# Patient Record
Sex: FEMALE | Race: WHITE | NOT HISPANIC OR LATINO | Employment: FULL TIME | URBAN - METROPOLITAN AREA
[De-identification: names, ages, dates, MRNs, and addresses within clinical notes are randomized per-mention and may not be internally consistent; named-entity substitution may affect disease eponyms.]

---

## 2017-02-20 ENCOUNTER — GENERIC CONVERSION - ENCOUNTER (OUTPATIENT)
Dept: OTHER | Facility: OTHER | Age: 62
End: 2017-02-20

## 2017-02-20 DIAGNOSIS — Z79.01 LONG TERM CURRENT USE OF ANTICOAGULANT: ICD-10-CM

## 2017-04-07 DIAGNOSIS — Z79.01 LONG TERM CURRENT USE OF ANTICOAGULANT: ICD-10-CM

## 2017-09-29 DIAGNOSIS — Z79.01 LONG TERM CURRENT USE OF ANTICOAGULANT: ICD-10-CM

## 2017-12-28 LAB — INR PPP: 2.5 (ref 0.86–1.16)

## 2018-01-10 NOTE — MISCELLANEOUS
Message  phong having TKR on 3/17, will stop coumadin 5 days, start lovenox 4 days and hold am of surgery and continue after until therapeutic  paitient will be in hospital for a couple of days, she will let us know  Active Problems    1  Long-term (current) use of anticoagulants (V58 61) (Z79 01)    Current Meds   1  ClonazePAM 0 25 MG Oral Tablet Dispersible; Therapy: (Recorded:26Jan2015) to Recorded   2  Enoxaparin Sodium 150 MG/ML Subcutaneous Solution; 150mg sq od; Therapy: 86BSH4634 to (Last Rx:71Spo1299)  Requested for: 43BTJ1142 Ordered   3  Hydrocodone-Acetaminophen 5-325 MG Oral Tablet; Therapy: 46LHI0005 to Recorded   4  Losartan Potassium-HCTZ 100-12 5 MG Oral Tablet; Therapy: 27HSX2255 to Recorded   5  Lyrica 75 MG Oral Capsule; Therapy: 85Lxf6904 to Recorded   6  Warfarin Sodium 5 MG Oral Tablet; TAKE ONE TABLET BY MOUTH ONCE DAILY AS   DIRECTED; Therapy: 96DTZ3730 to (Evaluate:24Jun2015); Last Rx:31Mar2014 Ordered    Allergies    1  No Known Drug Allergies    Plan  Long-term (current) use of anticoagulants    · Enoxaparin Sodium 150 MG/ML Subcutaneous Solution; 150mg sq od    Signatures   Electronically signed by :  Luis F Flynn, ; Feb 20 2017  5:42PM EST                       (Author)

## 2018-01-12 LAB — INR PPP: 2.4 (ref 0.86–1.16)

## 2018-01-12 NOTE — MISCELLANEOUS
Message  per Dr Velásquez Pap patient to have 1 5mg/kg weight   ordered 150 mgm lovenox once a day, patient instructed to stop coumadin 5 days before surgery, begin lovenox 4 days before, hold am of procedure, and then post op will be on lovenox and coumadin until therapeutic on coumadin again  Active Problems    1  Long-term (current) use of anticoagulants (V58 61) (Z79 01)    Current Meds   1  ClonazePAM 0 25 MG Oral Tablet Dispersible; Therapy: (Recorded:26Jan2015) to Recorded   2  Enoxaparin Sodium 150 MG/ML Subcutaneous Solution; 150mg sq od; Therapy: 94ZQX2863 to (Last HF:10GQR6996)  Requested for: 96QRP8221 Ordered   3  Hydrocodone-Acetaminophen 5-325 MG Oral Tablet; Therapy: 08YAW6358 to Recorded   4  Losartan Potassium-HCTZ 100-12 5 MG Oral Tablet; Therapy: 48TVY6277 to Recorded   5  Lyrica 75 MG Oral Capsule; Therapy: 22Egs6869 to Recorded   6  Warfarin Sodium 5 MG Oral Tablet; TAKE ONE TABLET BY MOUTH ONCE DAILY AS   DIRECTED; Therapy: 21DTI2010 to (Evaluate:24Jun2015); Last Rx:31Mar2014 Ordered    Allergies    1  No Known Drug Allergies    Plan  Long-term (current) use of anticoagulants    · Enoxaparin Sodium 150 MG/ML Subcutaneous Solution; 150mg sq od    Signatures   Electronically signed by :  Adeline Kay, ; Feb  3 2016 10:19AM EST                       (Author)

## 2018-01-12 NOTE — MISCELLANEOUS
Message  patient called, she is having a total hip replacement on 3/11/2016 and her surgeon , Dr Bridget Egan asked for our direction for lovenox bridge  please fax directions to [phone] and notifiy patient  I will task dR Sammy Alicia for directions  Active Problems    1  Long-term (current) use of anticoagulants (V58 61) (Z79 01)    Current Meds   1  ClonazePAM 0 25 MG Oral Tablet Dispersible; Therapy: (Recorded:26Jan2015) to Recorded   2  Enoxaparin Sodium 150 MG/ML Subcutaneous Solution; 150mg sq od; Therapy: 50QNC1669 to (Last KW:99JNQ6131)  Requested for: 48YXP6807 Ordered   3  Hydrocodone-Acetaminophen 5-325 MG Oral Tablet; Therapy: 97XHF8270 to Recorded   4  Losartan Potassium-HCTZ 100-12 5 MG Oral Tablet; Therapy: 47NTJ0377 to Recorded   5  Lyrica 75 MG Oral Capsule; Therapy: 27Wkn5027 to Recorded   6  Warfarin Sodium 5 MG Oral Tablet; TAKE ONE TABLET BY MOUTH ONCE DAILY AS   DIRECTED; Therapy: 54GYS9166 to (Evaluate:24Jun2015); Last Rx:31Mar2014 Ordered    Allergies    1  No Known Drug Allergies    Signatures   Electronically signed by :  Lilian Quan, ; Jan 22 2016 11:26AM EST                       (Author)

## 2018-01-26 ENCOUNTER — ANTICOAG VISIT (OUTPATIENT)
Dept: VASCULAR SURGERY | Facility: CLINIC | Age: 63
End: 2018-01-26

## 2018-01-26 LAB — INR PPP: 2.4 (ref 0.86–1.16)

## 2018-02-09 ENCOUNTER — ANTICOAG VISIT (OUTPATIENT)
Dept: VASCULAR SURGERY | Facility: CLINIC | Age: 63
End: 2018-02-09

## 2018-02-09 LAB — INR PPP: 2.4 (ref 0.86–1.16)

## 2018-02-12 ENCOUNTER — ANTICOAG VISIT (OUTPATIENT)
Dept: VASCULAR SURGERY | Facility: CLINIC | Age: 63
End: 2018-02-12

## 2018-02-12 LAB — INR PPP: 2.4 (ref 0.86–1.16)

## 2018-02-23 ENCOUNTER — ANTICOAG VISIT (OUTPATIENT)
Dept: VASCULAR SURGERY | Facility: CLINIC | Age: 63
End: 2018-02-23

## 2018-02-23 LAB — INR PPP: 2.3 (ref 0.86–1.16)

## 2018-04-09 ENCOUNTER — ANTICOAG VISIT (OUTPATIENT)
Dept: VASCULAR SURGERY | Facility: CLINIC | Age: 63
End: 2018-04-09

## 2018-04-09 LAB — INR PPP: 2.6 (ref 0.86–1.16)

## 2018-04-20 ENCOUNTER — ANTICOAG VISIT (OUTPATIENT)
Dept: VASCULAR SURGERY | Facility: CLINIC | Age: 63
End: 2018-04-20

## 2018-04-20 LAB — INR PPP: 2.4 (ref 0.86–1.16)

## 2018-05-21 ENCOUNTER — ANTICOAG VISIT (OUTPATIENT)
Dept: VASCULAR SURGERY | Facility: CLINIC | Age: 63
End: 2018-05-21

## 2018-05-21 LAB — INR PPP: 2.2 (ref 0.86–1.17)

## 2018-06-01 ENCOUNTER — ANTICOAG VISIT (OUTPATIENT)
Dept: VASCULAR SURGERY | Facility: CLINIC | Age: 63
End: 2018-06-01

## 2018-06-01 LAB — INR PPP: 2.8 (ref 0.86–1.17)

## 2018-06-15 ENCOUNTER — ANTICOAG VISIT (OUTPATIENT)
Dept: VASCULAR SURGERY | Facility: CLINIC | Age: 63
End: 2018-06-15

## 2018-06-15 LAB — INR PPP: 2.9 (ref 0.86–1.17)

## 2018-06-29 ENCOUNTER — ANTICOAG VISIT (OUTPATIENT)
Dept: VASCULAR SURGERY | Facility: CLINIC | Age: 63
End: 2018-06-29

## 2018-06-29 LAB — INR PPP: 2.6 (ref 0.86–1.17)

## 2018-07-13 ENCOUNTER — ANTICOAG VISIT (OUTPATIENT)
Dept: VASCULAR SURGERY | Facility: CLINIC | Age: 63
End: 2018-07-13

## 2018-07-13 LAB — INR PPP: 2.9 (ref 0.86–1.17)

## 2018-07-27 ENCOUNTER — ANTICOAG VISIT (OUTPATIENT)
Dept: VASCULAR SURGERY | Facility: CLINIC | Age: 63
End: 2018-07-27

## 2018-07-27 LAB — INR PPP: 2.8 (ref 0.86–1.17)

## 2018-08-13 ENCOUNTER — ANTICOAG VISIT (OUTPATIENT)
Dept: VASCULAR SURGERY | Facility: CLINIC | Age: 63
End: 2018-08-13

## 2018-08-13 LAB — INR PPP: 2.8 (ref 0.86–1.17)

## 2018-08-24 ENCOUNTER — ANTICOAG VISIT (OUTPATIENT)
Dept: VASCULAR SURGERY | Facility: CLINIC | Age: 63
End: 2018-08-24

## 2018-08-24 LAB — INR PPP: 2.6 (ref 0.86–1.17)

## 2018-09-07 ENCOUNTER — ANTICOAG VISIT (OUTPATIENT)
Dept: VASCULAR SURGERY | Facility: CLINIC | Age: 63
End: 2018-09-07

## 2018-09-07 LAB — INR PPP: 2.8 (ref 0.86–1.17)

## 2018-09-18 ENCOUNTER — TELEPHONE (OUTPATIENT)
Dept: VASCULAR SURGERY | Facility: CLINIC | Age: 63
End: 2018-09-18

## 2018-09-18 NOTE — TELEPHONE ENCOUNTER
Received letter that pt has been approved for [facility] home monitoring system for PT/INR testing  I called pt and LMOM informing her of this and requested she call us back to confirm that she has also received the letter and if not we can mail her a copy

## 2018-09-21 ENCOUNTER — ANTICOAG VISIT (OUTPATIENT)
Dept: VASCULAR SURGERY | Facility: CLINIC | Age: 63
End: 2018-09-21

## 2018-09-21 LAB — INR PPP: 2.7 (ref 0.86–1.17)

## 2018-09-22 ENCOUNTER — HOSPITAL ENCOUNTER (EMERGENCY)
Facility: HOSPITAL | Age: 63
Discharge: HOME | End: 2018-09-22
Attending: EMERGENCY MEDICINE | Admitting: EMERGENCY MEDICINE
Payer: COMMERCIAL

## 2018-09-22 ENCOUNTER — APPOINTMENT (EMERGENCY)
Dept: CT IMAGING | Facility: HOSPITAL | Age: 63
End: 2018-09-22
Payer: COMMERCIAL

## 2018-09-22 VITALS
HEART RATE: 68 BPM | BODY MASS INDEX: 33.13 KG/M2 | TEMPERATURE: 98 F | OXYGEN SATURATION: 97 % | RESPIRATION RATE: 18 BRPM | DIASTOLIC BLOOD PRESSURE: 56 MMHG | WEIGHT: 193 LBS | SYSTOLIC BLOOD PRESSURE: 94 MMHG

## 2018-09-22 DIAGNOSIS — F10.929 ALCOHOL INTOXICATION (HCC): ICD-10-CM

## 2018-09-22 DIAGNOSIS — S00.83XA FOREHEAD CONTUSION: ICD-10-CM

## 2018-09-22 DIAGNOSIS — S06.0X9A CONCUSSION: Primary | ICD-10-CM

## 2018-09-22 LAB
ANION GAP SERPL CALCULATED.3IONS-SCNC: 13 MMOL/L (ref 4–13)
APTT PPP: 44 SECONDS (ref 24–36)
BASOPHILS # BLD AUTO: 0.03 THOUSANDS/ΜL (ref 0–0.1)
BASOPHILS NFR BLD AUTO: 0 % (ref 0–1)
BUN SERPL-MCNC: 47 MG/DL (ref 5–25)
CALCIUM SERPL-MCNC: 9.2 MG/DL (ref 8.3–10.1)
CHLORIDE SERPL-SCNC: 101 MMOL/L (ref 100–108)
CO2 SERPL-SCNC: 25 MMOL/L (ref 21–32)
CREAT SERPL-MCNC: 1.23 MG/DL (ref 0.6–1.3)
EOSINOPHIL # BLD AUTO: 0.14 THOUSAND/ΜL (ref 0–0.61)
EOSINOPHIL NFR BLD AUTO: 2 % (ref 0–6)
ERYTHROCYTE [DISTWIDTH] IN BLOOD BY AUTOMATED COUNT: 12.9 % (ref 11.6–15.1)
ETHANOL SERPL-MCNC: 289 MG/DL (ref 0–3)
GFR SERPL CREATININE-BSD FRML MDRD: 46 ML/MIN/1.73SQ M
GLUCOSE SERPL-MCNC: 106 MG/DL (ref 65–140)
HCT VFR BLD AUTO: 37.6 % (ref 34.8–46.1)
HGB BLD-MCNC: 12.4 G/DL (ref 11.5–15.4)
IMM GRANULOCYTES # BLD AUTO: 0.03 THOUSAND/UL (ref 0–0.2)
IMM GRANULOCYTES NFR BLD AUTO: 0 % (ref 0–2)
INR PPP: 2.89 (ref 0.86–1.17)
LYMPHOCYTES # BLD AUTO: 3.27 THOUSANDS/ΜL (ref 0.6–4.47)
LYMPHOCYTES NFR BLD AUTO: 36 % (ref 14–44)
MCH RBC QN AUTO: 34.6 PG (ref 26.8–34.3)
MCHC RBC AUTO-ENTMCNC: 33 G/DL (ref 31.4–37.4)
MCV RBC AUTO: 105 FL (ref 82–98)
MONOCYTES # BLD AUTO: 1.57 THOUSAND/ΜL (ref 0.17–1.22)
MONOCYTES NFR BLD AUTO: 17 % (ref 4–12)
NEUTROPHILS # BLD AUTO: 4.15 THOUSANDS/ΜL (ref 1.85–7.62)
NEUTS SEG NFR BLD AUTO: 45 % (ref 43–75)
NRBC BLD AUTO-RTO: 0 /100 WBCS
PLATELET # BLD AUTO: 224 THOUSANDS/UL (ref 149–390)
PMV BLD AUTO: 11.2 FL (ref 8.9–12.7)
POTASSIUM SERPL-SCNC: 3.8 MMOL/L (ref 3.5–5.3)
PROTHROMBIN TIME: 29.4 SECONDS (ref 11.8–14.2)
RBC # BLD AUTO: 3.58 MILLION/UL (ref 3.81–5.12)
SODIUM SERPL-SCNC: 139 MMOL/L (ref 136–145)
WBC # BLD AUTO: 9.19 THOUSAND/UL (ref 4.31–10.16)

## 2018-09-22 PROCEDURE — 96360 HYDRATION IV INFUSION INIT: CPT

## 2018-09-22 PROCEDURE — 72125 CT NECK SPINE W/O DYE: CPT

## 2018-09-22 PROCEDURE — 80048 BASIC METABOLIC PNL TOTAL CA: CPT | Performed by: EMERGENCY MEDICINE

## 2018-09-22 PROCEDURE — 85730 THROMBOPLASTIN TIME PARTIAL: CPT | Performed by: EMERGENCY MEDICINE

## 2018-09-22 PROCEDURE — 36415 COLL VENOUS BLD VENIPUNCTURE: CPT | Performed by: EMERGENCY MEDICINE

## 2018-09-22 PROCEDURE — 70450 CT HEAD/BRAIN W/O DYE: CPT

## 2018-09-22 PROCEDURE — 85610 PROTHROMBIN TIME: CPT | Performed by: EMERGENCY MEDICINE

## 2018-09-22 PROCEDURE — 80320 DRUG SCREEN QUANTALCOHOLS: CPT | Performed by: EMERGENCY MEDICINE

## 2018-09-22 PROCEDURE — 85025 COMPLETE CBC W/AUTO DIFF WBC: CPT | Performed by: EMERGENCY MEDICINE

## 2018-09-22 PROCEDURE — 99284 EMERGENCY DEPT VISIT MOD MDM: CPT

## 2018-09-22 RX ADMIN — SODIUM CHLORIDE 1000 ML: 0.9 INJECTION, SOLUTION INTRAVENOUS at 20:18

## 2018-09-23 NOTE — ED PROVIDER NOTES
History  Chief Complaint   Patient presents with    Fall     Pt  c/o injury to left forehead after unwitnessed fall at home to the floor approx  45 minutes ago  Pt  admits to using alcohol, approximately 2-3 glasses of wine today  Unknown loc  Pt  (+) Coumadin  79-year-old female presents to the emergency department after falling at home  Patient's niece and best friend are at the bedside to help provide history  He states the patient has been drinking wine today  She does drink approximately 2 to 3 glasses of wine per day  This evening she lost her balance and struck the right side of her head on a piece of furniture  Family does suspect that she had a loss of consciousness  Patient was disoriented to year when her family assessed her  She is currently oriented to person place and time though she does seems groggy  No reported vomiting  No blood loss at the scene  History provided by:  Patient and medical records   used: No    Fall   Mechanism of injury: fall    Injury location:  Head/neck  Head/neck injury location:  Head  Incident location:  Home  Arrived directly from scene: yes    Fall:     Fall occurred:  Tripped    Impact surface:  Furniture    Point of impact:  Head    Entrapped after fall: no    Suspicion of alcohol use: yes    Suspicion of drug use: no    Prior to arrival data:     Patient ambulatory at scene: yes      Blood loss:  None    Responsiveness at scene: Groggy  Orientation at scene:  Person and place (Disoriented to time)    Loss of consciousness: Unknown  Amnesic to event: no    Associated symptoms: headaches    Associated symptoms: no back pain, no blindness, no nausea, no seizures and no vomiting    Risk factors: anticoagulation therapy        Prior to Admission Medications   Prescriptions Last Dose Informant Patient Reported? Taking? Calcium Carb-Cholecalciferol (CALCIUM 600 + D PO)   Yes Yes   Sig: Take 1 tablet by mouth daily  clonazePAM (KlonoPIN) 0 25 MG disintegrating tablet   Yes Yes   Sig: Take 0 25 mg by mouth 2 (two) times a day  losartan-hydrochlorothiazide (HYZAAR) 100-25 MG per tablet   Yes Yes   Sig: Take 1 tablet by mouth daily  warfarin (COUMADIN) 5 mg tablet   Yes Yes   Sig: Take 5 mg by mouth daily  Last dose 3/5      Facility-Administered Medications: None       Past Medical History:   Diagnosis Date    History of DVT of lower extremity     herson    History of pulmonary embolism     Hypertension     Lower back pain     OA (osteoarthritis)     PONV (postoperative nausea and vomiting)     with sodium pentathol    Spinal stenosis     Use of cane as ambulatory aid     Wears glasses        Past Surgical History:   Procedure Laterality Date    COLONOSCOPY      OVARIAN CYST REMOVAL      VT TOTAL HIP ARTHROPLASTY Right 3/11/2016    Procedure: ARTHROPLASTY HIP TOTAL;  Surgeon: Sinai Velasquez MD;  Location: [facility] OR;  Service: Orthopedics    TONSILLECTOMY         History reviewed  No pertinent family history  I have reviewed and agree with the history as documented  Social History   Substance Use Topics    Smoking status: Never Smoker    Smokeless tobacco: Not on file    Alcohol use Yes      Comment: 1-2  / day        Review of Systems   Eyes: Negative for blindness  Gastrointestinal: Negative for nausea and vomiting  Musculoskeletal: Negative for back pain  Neurological: Positive for headaches  Negative for seizures  All other systems reviewed and are negative  Physical Exam  Physical Exam   Constitutional: She is oriented to person, place, and time  She appears well-developed and well-nourished  HENT:   Head: Normocephalic  Head is with contusion  Head is without raccoon's eyes, without Washburn's sign and without laceration  Right Ear: Tympanic membrane normal  No hemotympanum  Left Ear: Tympanic membrane normal  No hemotympanum     Nose: No nasal deformity, septal deviation or nasal septal hematoma  Mouth/Throat: Uvula is midline and oropharynx is clear and moist  No oropharyngeal exudate  Eyes: Conjunctivae and EOM are normal  Pupils are equal, round, and reactive to light  Neck: Normal range of motion  Neck supple  No spinous process tenderness and no muscular tenderness present  No neck rigidity  Normal range of motion present  Cardiovascular: Normal rate, regular rhythm, normal heart sounds and intact distal pulses  Pulmonary/Chest: Effort normal and breath sounds normal    Abdominal: Soft  Bowel sounds are normal  She exhibits no distension  There is no tenderness  There is no rebound and no guarding  Musculoskeletal: Normal range of motion  She exhibits no edema, tenderness or deformity  Left hand: She exhibits no tenderness, normal capillary refill, no deformity and no swelling  Hands:  Ecchymosis of the left 3rd and 4th fingers, appears subacute   Lymphadenopathy:     She has no cervical adenopathy  Neurological: She is alert and oriented to person, place, and time  She has normal strength and normal reflexes  No cranial nerve deficit or sensory deficit  She exhibits normal muscle tone  Coordination and gait normal    Speech is clear   Skin: Skin is warm, dry and intact  No rash noted  Psychiatric: She has a normal mood and affect  Her behavior is normal  Judgment and thought content normal    Nursing note and vitals reviewed        Vital Signs  ED Triage Vitals   Temperature Pulse Respirations Blood Pressure SpO2   09/22/18 1953 09/22/18 1953 09/22/18 1953 09/22/18 1953 09/22/18 1953   98 °F (36 7 °C) 68 20 136/78 99 %      Temp Source Heart Rate Source Patient Position - Orthostatic VS BP Location FiO2 (%)   09/22/18 1953 09/22/18 2106 -- 09/22/18 2106 --   Oral Monitor  Left arm       Pain Score       09/22/18 1953       4           Vitals:    09/22/18 2100 09/22/18 2106 09/22/18 2115 09/22/18 2130   BP: 98/63 98/63 99/57 94/56   Pulse: 66 65 66 68 Visual Acuity  Visual Acuity      Most Recent Value   L Pupil Size (mm)  6   R Pupil Size (mm)  6          ED Medications  Medications   sodium chloride 0 9 % bolus 1,000 mL (0 mL Intravenous Stopped 9/22/18 2135)       Diagnostic Studies  Results Reviewed     Procedure Component Value Units Date/Time    Ethanol [[id number]]  (Abnormal) Collected:  09/22/18 2017    Lab Status:  Final result Specimen:  Blood from Arm, Right Updated:  09/22/18 2036     Ethanol Lvl 289 (H) mg/dL     Protime-INR [[id number]]  (Abnormal) Collected:  09/22/18 2017    Lab Status:  Final result Specimen:  Blood from Arm, Right Updated:  09/22/18 2036     Protime 29 4 (H) seconds      INR 2 89 (H)    APTT [[id number]]  (Abnormal) Collected:  09/22/18 2017    Lab Status:  Final result Specimen:  Blood from Arm, Right Updated:  09/22/18 2036     PTT 44 (H) seconds     Basic metabolic panel [[id number]]  (Abnormal) Collected:  09/22/18 2017    Lab Status:  Final result Specimen:  Blood from Arm, Right Updated:  09/22/18 2034     Sodium 139 mmol/L      Potassium 3 8 mmol/L      Chloride 101 mmol/L      CO2 25 mmol/L      ANION GAP 13 mmol/L      BUN 47 (H) mg/dL      Creatinine 1 23 mg/dL      Glucose 106 mg/dL      Calcium 9 2 mg/dL      eGFR 46 ml/min/1 73sq m     Narrative:         National Kidney Disease Education Program recommendations are as follows:  GFR calculation is accurate only with a steady state creatinine  Chronic Kidney disease less than 60 ml/min/1 73 sq  meters  Kidney failure less than 15 ml/min/1 73 sq  meters      CBC and differential [[id number]]  (Abnormal) Collected:  09/22/18 2017    Lab Status:  Final result Specimen:  Blood from Arm, Right Updated:  09/22/18 2024     WBC 9 19 Thousand/uL      RBC 3 58 (L) Million/uL      Hemoglobin 12 4 g/dL      Hematocrit 37 6 %       (H) fL      MCH 34 6 (H) pg      MCHC 33 0 g/dL      RDW 12 9 %      MPV 11 2 fL      Platelets 684 Thousands/uL      nRBC 0 /100 WBCs Neutrophils Relative 45 %      Immat GRANS % 0 %      Lymphocytes Relative 36 %      Monocytes Relative 17 (H) %      Eosinophils Relative 2 %      Basophils Relative 0 %      Neutrophils Absolute 4 15 Thousands/µL      Immature Grans Absolute 0 03 Thousand/uL      Lymphocytes Absolute 3 27 Thousands/µL      Monocytes Absolute 1 57 (H) Thousand/µL      Eosinophils Absolute 0 14 Thousand/µL      Basophils Absolute 0 03 Thousands/µL                  CT head without contrast   Final Result by Rodolfo Harrison MD (09/22 2128)      Left frontal scalp hematoma without subjacent fracture, intracranial hemorrhage, or other acute intracranial findings  Workstation performed: [id number]         CT cervical spine without contrast   Final Result by Rodolfo Harrison MD (09/22 2127)      No cervical spine fracture or traumatic malalignment  Workstation performed: [id number]                    Procedures  Procedures       Phone Contacts  ED Phone Contact    ED Course  ED Course as of Sep 22 2326   Sat Sep 22, 2018   2151 Patient did ambulate but seemed to be slightly off balance, likely a combination recent head injury as well as high alcohol level  Will provide p o  fluid and snack and observe for now, will recheck to ensure that is stability is improved prior to discharging home    2228 Patient is up and ambulating in the department without difficulty now  She did have a snack and tolerated oral intake  Patient's family does feel comfortable taking her home and will stay with her overnight                                  MDM  Number of Diagnoses or Management Options  Alcohol intoxication ([facility] [address] ): new and requires workup  Concussion: new and requires workup  Forehead contusion: new and requires workup     Amount and/or Complexity of Data Reviewed  Clinical lab tests: ordered and reviewed  Tests in the radiology section of CPT®: ordered and reviewed  Decide to obtain previous medical records or to obtain history from someone other than the patient: yes  Obtain history from someone other than the patient: yes  Independent visualization of images, tracings, or specimens: yes    Risk of Complications, Morbidity, and/or Mortality  General comments: 26-year-old female status post fall from standing while intoxicated, anticoagulated with Coumadin  Patient's CT scan demonstrates a soft tissue could hematoma no intracranial pathology noted  Patient appears clinically sober her speech is clear and she is able to ambulate without assistance  Family does feel comfortable taking her home  I discussed concussion and signs and symptoms to return to the emergency department with the patient and her niece who plans to stay with her tonight  Patient Progress  Patient progress: improved    CritCare Time    Disposition  Final diagnoses:   Concussion   Forehead contusion   Alcohol intoxication ([facility] [address] )     Time reflects when diagnosis was documented in both MDM as applicable and the Disposition within this note     Time User Action Codes Description Comment    9/22/2018 10:30 PM Sandi Neumann Add [S06 0X9A] Concussion     9/22/2018 10:30 PM Farhana Sprague Add [S00 83XA] Forehead contusion     9/22/2018 10:30 PM Farhana Sprague Add [F10 929] Alcohol intoxication [facility])       ED Disposition     ED Disposition Condition Comment    Discharge  [place] discharge to home/self care  Condition at discharge: Stable        Follow-up Information     Follow up With Specialties Details Why Contact Iliana Watson MD Internal Medicine Schedule an appointment as soon as possible for a visit in 2 days For recheck of current symptoms [address] [place] [address]             Discharge Medication List as of 9/22/2018 10:31 PM      CONTINUE these medications which have NOT CHANGED    Details   Calcium Carb-Cholecalciferol (CALCIUM 600 + D PO) Take 1 tablet by mouth daily  , Until Discontinued, Historical Med      clonazePAM (KlonoPIN) 0 25 MG disintegrating tablet Take 0 25 mg by mouth 2 (two) times a day , Until Discontinued, Historical Med      losartan-hydrochlorothiazide (HYZAAR) 100-25 MG per tablet Take 1 tablet by mouth daily  , Until Discontinued, Historical Med      warfarin (COUMADIN) 5 mg tablet Take 5 mg by mouth daily  Last dose 3/5, Until Discontinued, Historical Med           No discharge procedures on file      ED Provider  Electronically Signed by           Jaxon Rocha DO  09/22/18 3029

## 2018-09-23 NOTE — DISCHARGE INSTRUCTIONS
Concussion   WHAT YOU NEED TO KNOW:   A concussion is a mild brain injury  It is usually caused by a bump or blow to the head from a fall, a motor vehicle crash, or a sports injury  Sometimes being shaken forcefully may cause a concussion  DISCHARGE INSTRUCTIONS:   Have someone else call 911 for the following:   · Someone tries to wake you and cannot do so  · You have a seizure, increasing confusion, or a change in personality  · Your speech becomes slurred, or you have new vision problems  Return to the emergency department if:   · You have a severe headache that does not go away  · You have arm or leg weakness, numbness, or new problems with coordination  · You have blood or clear fluid coming out of the ears or nose  Contact your healthcare provider if:   · You have nausea or are vomiting  · You feel more sleepy than usual     · Your symptoms get worse  · Your symptoms last longer than 6 weeks after the injury  · You have questions or concerns about your condition or care  Medicines:   · Acetaminophen  helps to decrease pain  It is available without a doctor's order  Ask how much to take and how often to take it  Follow directions  Acetaminophen can cause liver damage if not taken correctly  · NSAIDs , such as ibuprofen, help decrease swelling and pain  NSAIDs can cause stomach bleeding or kidney problems in certain people  If you take blood thinner medicine, always ask your healthcare provider if NSAIDs are safe for you  Always read the medicine label and follow directions  · Take your medicine as directed  Contact your healthcare provider if you think your medicine is not helping or if you have side effects  Tell him or her if you are allergic to any medicine  Keep a list of the medicines, vitamins, and herbs you take  Include the amounts, and when and why you take them  Bring the list or the pill bottles to follow-up visits   Carry your medicine list with you in case of an emergency  Follow up with your healthcare provider as directed:  Write down your questions so you remember to ask them during your visits  Self-care:   · Rest  from physical and mental activities as directed  Mental activities are those that require thinking, concentration, and attention  You will need to rest until your symptoms are gone  Rest will allow you to recover from your concussion  Ask your healthcare provider when you can return to work and other daily activities  · Have someone stay with you for the first 24 hours after your injury  Your healthcare provider should be contacted if your symptoms get worse, or you develop new symptoms  · Do not participate in sports and physical activities until your healthcare provider says it is okay  They could make your symptoms worse or lead to another concussion  Your healthcare provider will tell you when it is okay for you to return to sports or physical activities  Prevent another concussion:   · Wear protective sports equipment that fit properly  Helmets help decrease your risk of a serious brain injury  Talk to your healthcare provider about ways you can decrease your risk for a concussion if you play sports  · Wear your seat belt  every time you travel  This helps to decrease your risk of a head injury if you are in a car accident  © 2017 [facility] Information is for End User's use only and may not be sold, redistributed or otherwise used for commercial purposes  All illustrations and images included in CareNotes® are the copyrighted property of A D A M , Inc  or [employer] Pickett  The above information is an  only  It is not intended as medical advice for individual conditions or treatments  Talk to your doctor, nurse or pharmacist before following any medical regimen to see if it is safe and effective for you      Alcohol Intoxication   WHAT YOU NEED TO KNOW:   Alcohol intoxication is a harmful physical condition caused when you drink more alcohol than your body can handle  It is also called ethanol poisoning, or being drunk  DISCHARGE INSTRUCTIONS:   Medicine: You may be given medicine to manage the signs and symptoms of alcohol intoxication  Take your medicine as directed  Contact your healthcare provider if you think your medicine is not helping or if you have side effects  Tell him if you are allergic to any medicine  Keep a list of the medicines, vitamins, and herbs you take  Include the amounts, and when and why you take them  Bring the list or the pill bottles to follow-up visits  Keep the list with you in case of emergency  Follow up with your healthcare provider as directed:  Write down your questions so you remember to ask them during your visits  Limit or avoid alcohol:  Men should not have more than 2 drinks per day  Women should not have more than 1 drink per day  A drink is 12 ounces of beer, 5 ounces of wine, or 1½ ounces of liquor  Do not drive or operate machines when you drink alcohol:  Make sure you always have someone to drive you when you drink alcohol  Learn ways to manage stress  Deep breathing, meditation, and listening to music may help you cope with stressful events  Talk to your caregiver about other ways to manage stress  For more information:   · Alcoholics Anonymous  Web Address: http://www campos info/  Contact your healthcare provider if:   · You need help to stop drinking alcohol  · You have trouble with work or school because you drink too much alcohol  · You have physical or verbal fights because of alcohol  · You have questions or concerns about your condition or care  Seek care immediately or call 911 if:   · You have sudden trouble breathing or chest pain  · You have a seizure  · You feel sad enough to harm yourself or others  · You have hallucinations (you see or hear things that are not real)  · You cannot stop vomiting       · You were in an accident because of alcohol  © 2017 [address] [employer] Information is for End User's use only and may not be sold, redistributed or otherwise used for commercial purposes  All illustrations and images included in CareNotes® are the copyrighted property of A D A M , Inc  or [employer] Pickett  The above information is an  only  It is not intended as medical advice for individual conditions or treatments  Talk to your doctor, nurse or pharmacist before following any medical regimen to see if it is safe and effective for you  Contusion in Adults   AMBULATORY CARE:   A contusion  is a bruise that appears on your skin after an injury  A bruise happens when small blood vessels tear but skin does not  When blood vessels tear, blood leaks into nearby tissue, such as soft tissue or muscle  Other signs and symptoms you may have with a contusion:   · Pain that increases when you touch the bruise, walk, or use the area around the bruise    · Swelling or a lump at the site of the bruise or near it    · Red, blue, or black skin that may change to green or yellow after a few days           · Stiffness or problems moving the bruised area of your body  Seek care immediately if:   · You have new trouble moving the injured area  · You have tingling or numbness in or near the injured area  · Your hand or foot below the bruise gets cold or turns pale  Contact your healthcare provider if:   · You find a new lump in the injured area  · Your symptoms do not improve with treatment after 4 to 5 days  · You have questions or concerns about your condition or care  Treatment for a contusion  may include any of the following:  · NSAIDs , such as ibuprofen, help decrease swelling, pain, and fever  This medicine is available with or without a doctor's order  NSAIDs can cause stomach bleeding or kidney problems in certain people   If you take blood thinner medicine, always ask your healthcare provider if NSAIDs are safe for you  Always read the medicine label and follow directions  · Prescription pain medicine  may be given  Do not wait until the pain is severe before you take your medicine  · Aspiration  is a procedure used to drain pooled blood in your muscle  This may help prevent increased pressure in the muscle  · Surgery  may be done to repair a tear in the muscle or relieve pressure in the muscle caused by swelling  Help a contusion heal:   · Rest the injured area  or use it less than usual  If you bruised your leg or foot, you may need crutches or a cane to help you walk  This will help you keep weight off your injured body part  · Apply ice  to decrease swelling and pain  Ice may also help prevent tissue damage  Use an ice pack, or put crushed ice in a plastic bag  Cover it with a towel and place it on your bruise for 15 to 20 minutes every hour or as directed  · Use compression  to support the area and decrease swelling  Wrap an elastic bandage around the area over the bruised muscle  Make sure the bandage is not too tight  You should be able to fit 1 finger between the bandage and your skin  · Elevate (raise) your injured body part  above the level of your heart to help decrease pain and swelling  Use pillows, blankets, or rolled towels to elevate the area as often as you can  · Do not drink alcohol  as directed  Alcohol may slow healing  · Do not stretch injured muscles  right after your injury  Ask your healthcare provider when and how you may safely stretch after your injury  Gentle stretches can help increase your flexibility  · Do not massage the area or put heating pads  on the bruise right after your injury  Heat and massage may slow healing  Your healthcare provider may tell you to apply heat after several days   At that time, heat will start to help the injury heal   Follow up with your healthcare provider as directed:  Write down your questions so you remember to ask them during your visits  © 2017 [address] [employer] Information is for End User's use only and may not be sold, redistributed or otherwise used for commercial purposes  All illustrations and images included in CareNotes® are the copyrighted property of A [employer] M [employer] Inc  or Luis Antonio Pickett  The above information is an  only  It is not intended as medical advice for individual conditions or treatments  Talk to your doctor, nurse or pharmacist before following any medical regimen to see if it is safe and effective for you

## 2018-10-03 DIAGNOSIS — Z92.29 HX OF LONG TERM USE OF BLOOD THINNERS: Primary | ICD-10-CM

## 2018-10-04 RX ORDER — WARFARIN SODIUM 5 MG/1
TABLET ORAL
Qty: 90 TABLET | Refills: 3 | Status: SHIPPED | OUTPATIENT
Start: 2018-10-04 | End: 2019-10-14 | Stop reason: SDUPTHER

## 2018-10-05 ENCOUNTER — ANTICOAG VISIT (OUTPATIENT)
Dept: VASCULAR SURGERY | Facility: CLINIC | Age: 63
End: 2018-10-05

## 2018-10-05 LAB — INR PPP: 2.4 (ref 0.86–1.17)

## 2018-10-22 ENCOUNTER — ANTICOAG VISIT (OUTPATIENT)
Dept: VASCULAR SURGERY | Facility: CLINIC | Age: 63
End: 2018-10-22

## 2018-10-22 LAB — INR PPP: 2.5 (ref 0.86–1.17)

## 2018-11-02 ENCOUNTER — ANTICOAG VISIT (OUTPATIENT)
Dept: VASCULAR SURGERY | Facility: CLINIC | Age: 63
End: 2018-11-02

## 2018-11-02 LAB — INR PPP: 2.8 (ref 0.86–1.17)

## 2018-11-16 ENCOUNTER — ANTICOAG VISIT (OUTPATIENT)
Dept: VASCULAR SURGERY | Facility: CLINIC | Age: 63
End: 2018-11-16

## 2018-11-16 LAB — INR PPP: 2.9 (ref 0.86–1.17)

## 2018-12-03 ENCOUNTER — ANTICOAG VISIT (OUTPATIENT)
Dept: VASCULAR SURGERY | Facility: CLINIC | Age: 63
End: 2018-12-03

## 2018-12-03 LAB — INR PPP: 2.5 (ref 0.86–1.17)

## 2018-12-17 ENCOUNTER — ANTICOAG VISIT (OUTPATIENT)
Dept: VASCULAR SURGERY | Facility: CLINIC | Age: 63
End: 2018-12-17

## 2018-12-17 LAB — INR PPP: 2.9 (ref 0.86–1.17)

## 2018-12-28 ENCOUNTER — ANTICOAG VISIT (OUTPATIENT)
Dept: VASCULAR SURGERY | Facility: CLINIC | Age: 63
End: 2018-12-28

## 2018-12-28 LAB — INR PPP: 2.6 (ref 0.86–1.17)

## 2019-01-11 ENCOUNTER — ANTICOAG VISIT (OUTPATIENT)
Dept: VASCULAR SURGERY | Facility: CLINIC | Age: 64
End: 2019-01-11

## 2019-01-11 LAB — INR PPP: 2.4 (ref 0.86–1.17)

## 2019-01-25 ENCOUNTER — ANTICOAG VISIT (OUTPATIENT)
Dept: VASCULAR SURGERY | Facility: CLINIC | Age: 64
End: 2019-01-25

## 2019-01-25 LAB — INR PPP: 2.9 (ref 0.86–1.17)

## 2019-02-08 ENCOUNTER — ANTICOAG VISIT (OUTPATIENT)
Dept: VASCULAR SURGERY | Facility: CLINIC | Age: 64
End: 2019-02-08

## 2019-02-08 LAB — INR PPP: 2.8 (ref 0.86–1.17)

## 2019-02-11 ENCOUNTER — ANTICOAG VISIT (OUTPATIENT)
Dept: VASCULAR SURGERY | Facility: CLINIC | Age: 64
End: 2019-02-11

## 2019-02-11 LAB — INR PPP: 2.8 (ref 0.86–1.17)

## 2019-02-22 ENCOUNTER — ANTICOAG VISIT (OUTPATIENT)
Dept: VASCULAR SURGERY | Facility: CLINIC | Age: 64
End: 2019-02-22

## 2019-02-22 LAB — INR PPP: 2.7 (ref 0.86–1.17)

## 2019-03-11 ENCOUNTER — ANTICOAG VISIT (OUTPATIENT)
Dept: VASCULAR SURGERY | Facility: CLINIC | Age: 64
End: 2019-03-11

## 2019-03-11 LAB — INR PPP: 2.6 (ref 0.86–1.17)

## 2019-03-25 ENCOUNTER — ANTICOAG VISIT (OUTPATIENT)
Dept: VASCULAR SURGERY | Facility: CLINIC | Age: 64
End: 2019-03-25

## 2019-03-25 LAB — INR PPP: 2.8 (ref 0.86–1.17)

## 2019-04-10 ENCOUNTER — ANTICOAG VISIT (OUTPATIENT)
Dept: VASCULAR SURGERY | Facility: CLINIC | Age: 64
End: 2019-04-10

## 2019-04-10 LAB — INR PPP: 2.7 (ref 0.86–1.17)

## 2019-04-23 LAB — INR PPP: 2.5 (ref 0.86–1.17)

## 2019-04-24 ENCOUNTER — ANTICOAG VISIT (OUTPATIENT)
Dept: VASCULAR SURGERY | Facility: CLINIC | Age: 64
End: 2019-04-24

## 2019-05-06 ENCOUNTER — ANTICOAG VISIT (OUTPATIENT)
Dept: VASCULAR SURGERY | Facility: CLINIC | Age: 64
End: 2019-05-06

## 2019-05-06 LAB — INR PPP: 2.8 (ref 0.86–1.17)

## 2019-05-20 ENCOUNTER — ANTICOAG VISIT (OUTPATIENT)
Dept: VASCULAR SURGERY | Facility: CLINIC | Age: 64
End: 2019-05-20

## 2019-05-20 LAB — INR PPP: 2.7 (ref 0.86–1.17)

## 2019-06-04 ENCOUNTER — ANTICOAG VISIT (OUTPATIENT)
Dept: VASCULAR SURGERY | Facility: CLINIC | Age: 64
End: 2019-06-04

## 2019-06-04 LAB — INR PPP: 2.3 (ref 0.86–1.17)

## 2019-06-17 ENCOUNTER — ANTICOAG VISIT (OUTPATIENT)
Dept: VASCULAR SURGERY | Facility: CLINIC | Age: 64
End: 2019-06-17

## 2019-06-17 LAB — INR PPP: 2.7 (ref 0.86–1.17)

## 2019-07-01 ENCOUNTER — ANTICOAG VISIT (OUTPATIENT)
Dept: VASCULAR SURGERY | Facility: CLINIC | Age: 64
End: 2019-07-01

## 2019-07-01 LAB — INR PPP: 2.8 (ref 0.84–1.19)

## 2019-07-15 ENCOUNTER — ANTICOAG VISIT (OUTPATIENT)
Dept: VASCULAR SURGERY | Facility: CLINIC | Age: 64
End: 2019-07-15

## 2019-07-15 LAB — INR PPP: 2.3 (ref 0.84–1.19)

## 2019-07-29 ENCOUNTER — ANTICOAG VISIT (OUTPATIENT)
Dept: VASCULAR SURGERY | Facility: CLINIC | Age: 64
End: 2019-07-29

## 2019-07-29 LAB — INR PPP: 2.8 (ref 0.84–1.19)

## 2019-08-05 ENCOUNTER — TELEPHONE (OUTPATIENT)
Dept: VASCULAR SURGERY | Facility: CLINIC | Age: 64
End: 2019-08-05

## 2019-08-12 ENCOUNTER — ANTICOAG VISIT (OUTPATIENT)
Dept: VASCULAR SURGERY | Facility: CLINIC | Age: 64
End: 2019-08-12

## 2019-08-12 LAB — INR PPP: 2.8 (ref 0.84–1.19)

## 2019-08-28 ENCOUNTER — ANTICOAG VISIT (OUTPATIENT)
Dept: VASCULAR SURGERY | Facility: CLINIC | Age: 64
End: 2019-08-28

## 2019-08-28 LAB — INR PPP: 2.5 (ref 0.84–1.19)

## 2019-08-28 NOTE — PROGRESS NOTES
Spoke with patient and advised to continue same Coumadin dosing  Repeat INR in 2 wk  She verbalized understanding

## 2019-09-11 ENCOUNTER — ANTICOAG VISIT (OUTPATIENT)
Dept: VASCULAR SURGERY | Facility: CLINIC | Age: 64
End: 2019-09-11

## 2019-09-11 LAB — INR PPP: 2.6 (ref 0.84–1.19)

## 2019-09-25 ENCOUNTER — ANTICOAG VISIT (OUTPATIENT)
Dept: VASCULAR SURGERY | Facility: CLINIC | Age: 64
End: 2019-09-25

## 2019-09-25 LAB — INR PPP: 2.4 (ref 0.84–1.19)

## 2019-10-14 DIAGNOSIS — Z92.29 HX OF LONG TERM USE OF BLOOD THINNERS: ICD-10-CM

## 2019-10-14 RX ORDER — WARFARIN SODIUM 5 MG/1
TABLET ORAL
Qty: 90 TABLET | Refills: 3 | Status: SHIPPED | OUTPATIENT
Start: 2019-10-14 | End: 2020-10-29

## 2019-10-23 ENCOUNTER — ANTICOAG VISIT (OUTPATIENT)
Dept: VASCULAR SURGERY | Facility: CLINIC | Age: 64
End: 2019-10-23

## 2019-10-23 LAB — INR PPP: 2.5 (ref 0.84–1.19)

## 2019-11-06 ENCOUNTER — ANTICOAG VISIT (OUTPATIENT)
Dept: VASCULAR SURGERY | Facility: CLINIC | Age: 64
End: 2019-11-06

## 2019-11-06 LAB — INR PPP: 2.1 (ref 0.84–1.19)

## 2019-11-20 ENCOUNTER — ANTICOAG VISIT (OUTPATIENT)
Dept: VASCULAR SURGERY | Facility: CLINIC | Age: 64
End: 2019-11-20

## 2019-11-20 LAB — INR PPP: 2.9 (ref 0.84–1.19)

## 2019-11-20 NOTE — PROGRESS NOTES
Spoke with patient and advised to continue same dosing schedule  Next PT/INR 2 wk  Patient verbalized understanding

## 2019-12-04 ENCOUNTER — ANTICOAG VISIT (OUTPATIENT)
Dept: VASCULAR SURGERY | Facility: CLINIC | Age: 64
End: 2019-12-04

## 2019-12-04 LAB — INR PPP: 2.5 (ref 0.84–1.19)

## 2019-12-18 ENCOUNTER — ANTICOAG VISIT (OUTPATIENT)
Dept: VASCULAR SURGERY | Facility: CLINIC | Age: 64
End: 2019-12-18

## 2019-12-18 LAB — INR PPP: 2.1 (ref 0.84–1.19)

## 2019-12-18 NOTE — PROGRESS NOTES
Spoke with patient and advised to continue same dosing schedule and repeat INR in 2 wk  She verbalized understanding

## 2020-01-02 ENCOUNTER — ANTICOAG VISIT (OUTPATIENT)
Dept: VASCULAR SURGERY | Facility: CLINIC | Age: 65
End: 2020-01-02

## 2020-01-02 LAB — INR PPP: 2.9 (ref 0.84–1.19)

## 2020-01-15 ENCOUNTER — ANTICOAG VISIT (OUTPATIENT)
Dept: VASCULAR SURGERY | Facility: CLINIC | Age: 65
End: 2020-01-15

## 2020-01-15 LAB — INR PPP: 2.6 (ref 0.84–1.19)

## 2020-01-15 NOTE — PROGRESS NOTES
Spoke with patient and dosed per protocol  Advised to retest in 2 wks  Patient verbalized understanding

## 2020-01-29 ENCOUNTER — ANTICOAG VISIT (OUTPATIENT)
Dept: VASCULAR SURGERY | Facility: CLINIC | Age: 65
End: 2020-01-29

## 2020-01-29 LAB — INR PPP: 2.1 (ref 0.84–1.19)

## 2020-02-12 ENCOUNTER — ANTICOAG VISIT (OUTPATIENT)
Dept: VASCULAR SURGERY | Facility: CLINIC | Age: 65
End: 2020-02-12

## 2020-02-12 LAB — INR PPP: 2.9 (ref 0.84–1.19)

## 2020-02-12 NOTE — PROGRESS NOTES
Spoke with patient and dosed per protocol  Next INR check in 2 wk  Patient verbalized understanding

## 2020-02-26 ENCOUNTER — ANTICOAG VISIT (OUTPATIENT)
Dept: VASCULAR SURGERY | Facility: CLINIC | Age: 65
End: 2020-02-26

## 2020-02-26 LAB — INR PPP: 2.9 (ref 0.84–1.19)

## 2020-03-11 ENCOUNTER — ANTICOAG VISIT (OUTPATIENT)
Dept: VASCULAR SURGERY | Facility: CLINIC | Age: 65
End: 2020-03-11

## 2020-03-11 LAB — INR PPP: 2.8 (ref 0.84–1.19)

## 2020-03-25 ENCOUNTER — ANTICOAG VISIT (OUTPATIENT)
Dept: VASCULAR SURGERY | Facility: CLINIC | Age: 65
End: 2020-03-25

## 2020-03-25 LAB — INR PPP: 2.5 (ref 0.84–1.19)

## 2020-03-25 NOTE — PROGRESS NOTES
Left detailed message for patient to continue same dosing schedule and repeat INR 2 wk  If she has any questions she was advised to call office

## 2020-04-03 ENCOUNTER — TELEPHONE (OUTPATIENT)
Dept: VASCULAR SURGERY | Facility: CLINIC | Age: 65
End: 2020-04-03

## 2020-04-08 ENCOUNTER — ANTICOAG VISIT (OUTPATIENT)
Dept: VASCULAR SURGERY | Facility: CLINIC | Age: 65
End: 2020-04-08

## 2020-04-08 LAB — INR PPP: 2.9 (ref 0.84–1.19)

## 2020-04-22 ENCOUNTER — ANTICOAG VISIT (OUTPATIENT)
Dept: VASCULAR SURGERY | Facility: CLINIC | Age: 65
End: 2020-04-22

## 2020-04-22 LAB — INR PPP: 2.9 (ref 0.84–1.19)

## 2020-05-06 ENCOUNTER — ANTICOAG VISIT (OUTPATIENT)
Dept: VASCULAR SURGERY | Facility: CLINIC | Age: 65
End: 2020-05-06

## 2020-05-06 LAB — INR PPP: 2.1 (ref 0.84–1.19)

## 2020-05-20 ENCOUNTER — ANTICOAG VISIT (OUTPATIENT)
Dept: VASCULAR SURGERY | Facility: CLINIC | Age: 65
End: 2020-05-20

## 2020-05-20 LAB — INR PPP: 2.9 (ref 0.84–1.19)

## 2020-06-03 ENCOUNTER — ANTICOAG VISIT (OUTPATIENT)
Dept: VASCULAR SURGERY | Facility: CLINIC | Age: 65
End: 2020-06-03

## 2020-06-03 LAB — INR PPP: 2.4 (ref 0.84–1.19)

## 2020-06-17 ENCOUNTER — ANTICOAG VISIT (OUTPATIENT)
Dept: VASCULAR SURGERY | Facility: CLINIC | Age: 65
End: 2020-06-17

## 2020-06-17 LAB — INR PPP: 3 (ref 0.84–1.19)

## 2020-07-01 ENCOUNTER — ANTICOAG VISIT (OUTPATIENT)
Dept: VASCULAR SURGERY | Facility: CLINIC | Age: 65
End: 2020-07-01

## 2020-07-01 LAB — INR PPP: 2.2 (ref 0.84–1.19)

## 2020-07-15 ENCOUNTER — ANTICOAG VISIT (OUTPATIENT)
Dept: VASCULAR SURGERY | Facility: CLINIC | Age: 65
End: 2020-07-15

## 2020-07-15 LAB — INR PPP: 2.6 (ref 0.84–1.19)

## 2020-07-29 ENCOUNTER — ANTICOAG VISIT (OUTPATIENT)
Dept: VASCULAR SURGERY | Facility: CLINIC | Age: 65
End: 2020-07-29

## 2020-07-29 LAB — INR PPP: 2.3 (ref 0.84–1.19)

## 2020-08-12 ENCOUNTER — ANTICOAG VISIT (OUTPATIENT)
Dept: VASCULAR SURGERY | Facility: CLINIC | Age: 65
End: 2020-08-12

## 2020-08-12 LAB — INR PPP: 2.6 (ref 0.84–1.19)

## 2020-08-26 ENCOUNTER — ANTICOAG VISIT (OUTPATIENT)
Dept: VASCULAR SURGERY | Facility: CLINIC | Age: 65
End: 2020-08-26

## 2020-08-26 LAB — INR PPP: 2.9 (ref 0.84–1.19)

## 2020-09-09 ENCOUNTER — ANTICOAG VISIT (OUTPATIENT)
Dept: VASCULAR SURGERY | Facility: CLINIC | Age: 65
End: 2020-09-09

## 2020-09-09 LAB — INR PPP: 2.6 (ref 0.84–1.19)

## 2020-09-09 NOTE — PROGRESS NOTES
Spoke with patient and dosed per protocol  Next INR check 2 weeks  Patient verbalized understanding

## 2020-09-23 ENCOUNTER — ANTICOAG VISIT (OUTPATIENT)
Dept: VASCULAR SURGERY | Facility: CLINIC | Age: 65
End: 2020-09-23

## 2020-09-23 LAB — INR PPP: 2.6 (ref 0.84–1.19)

## 2020-10-07 ENCOUNTER — ANTICOAG VISIT (OUTPATIENT)
Dept: VASCULAR SURGERY | Facility: CLINIC | Age: 65
End: 2020-10-07

## 2020-10-07 LAB — INR PPP: 2.9 (ref 0.84–1.19)

## 2020-10-21 ENCOUNTER — ANTICOAG VISIT (OUTPATIENT)
Dept: VASCULAR SURGERY | Facility: CLINIC | Age: 65
End: 2020-10-21

## 2020-10-21 LAB — INR PPP: 2 (ref 0.84–1.19)

## 2020-10-29 DIAGNOSIS — Z92.29 HX OF LONG TERM USE OF BLOOD THINNERS: ICD-10-CM

## 2020-10-29 RX ORDER — WARFARIN SODIUM 5 MG/1
TABLET ORAL
Qty: 90 TABLET | Refills: 3 | Status: SHIPPED | OUTPATIENT
Start: 2020-10-29 | End: 2021-09-23

## 2020-11-04 ENCOUNTER — ANTICOAG VISIT (OUTPATIENT)
Dept: VASCULAR SURGERY | Facility: CLINIC | Age: 65
End: 2020-11-04

## 2020-11-04 LAB — INR PPP: 2.1 (ref 0.84–1.19)

## 2020-11-18 ENCOUNTER — ANTICOAG VISIT (OUTPATIENT)
Dept: VASCULAR SURGERY | Facility: CLINIC | Age: 65
End: 2020-11-18

## 2020-11-18 LAB — INR PPP: 2.2 (ref 0.84–1.19)

## 2020-12-02 ENCOUNTER — ANTICOAG VISIT (OUTPATIENT)
Dept: VASCULAR SURGERY | Facility: CLINIC | Age: 65
End: 2020-12-02

## 2020-12-02 LAB — INR PPP: 2.8 (ref 0.84–1.19)

## 2020-12-16 ENCOUNTER — ANTICOAG VISIT (OUTPATIENT)
Dept: VASCULAR SURGERY | Facility: CLINIC | Age: 65
End: 2020-12-16

## 2020-12-16 LAB — INR PPP: 2.8 (ref 0.84–1.19)

## 2020-12-30 ENCOUNTER — ANTICOAG VISIT (OUTPATIENT)
Dept: VASCULAR SURGERY | Facility: CLINIC | Age: 65
End: 2020-12-30

## 2020-12-30 LAB — INR PPP: 2.8 (ref 0.84–1.19)

## 2021-01-13 ENCOUNTER — ANTICOAG VISIT (OUTPATIENT)
Dept: VASCULAR SURGERY | Facility: CLINIC | Age: 66
End: 2021-01-13

## 2021-01-13 LAB — INR PPP: 2.2 (ref 0.84–1.19)

## 2021-01-27 ENCOUNTER — ANTICOAG VISIT (OUTPATIENT)
Dept: VASCULAR SURGERY | Facility: CLINIC | Age: 66
End: 2021-01-27

## 2021-01-27 LAB — INR PPP: 2.5 (ref 0.84–1.19)

## 2021-02-10 ENCOUNTER — ANTICOAG VISIT (OUTPATIENT)
Dept: VASCULAR SURGERY | Facility: CLINIC | Age: 66
End: 2021-02-10

## 2021-02-10 LAB — INR PPP: 2.7 (ref 0.84–1.19)

## 2021-02-24 ENCOUNTER — ANTICOAG VISIT (OUTPATIENT)
Dept: VASCULAR SURGERY | Facility: CLINIC | Age: 66
End: 2021-02-24

## 2021-02-24 LAB — INR PPP: 2.2 (ref 0.84–1.19)

## 2021-03-10 ENCOUNTER — ANTICOAG VISIT (OUTPATIENT)
Dept: VASCULAR SURGERY | Facility: CLINIC | Age: 66
End: 2021-03-10

## 2021-03-10 LAB — INR PPP: 2.1 (ref 0.84–1.19)

## 2021-03-24 ENCOUNTER — ANTICOAG VISIT (OUTPATIENT)
Dept: VASCULAR SURGERY | Facility: CLINIC | Age: 66
End: 2021-03-24

## 2021-03-24 LAB — INR PPP: 2.8 (ref 0.84–1.19)

## 2021-04-07 ENCOUNTER — ANTICOAG VISIT (OUTPATIENT)
Dept: VASCULAR SURGERY | Facility: CLINIC | Age: 66
End: 2021-04-07

## 2021-04-07 LAB — INR PPP: 2.7 (ref 0.84–1.19)

## 2021-04-21 ENCOUNTER — ANTICOAG VISIT (OUTPATIENT)
Dept: VASCULAR SURGERY | Facility: CLINIC | Age: 66
End: 2021-04-21

## 2021-04-21 LAB — INR PPP: 2 (ref 0.84–1.19)

## 2021-05-05 ENCOUNTER — ANTICOAG VISIT (OUTPATIENT)
Dept: VASCULAR SURGERY | Facility: CLINIC | Age: 66
End: 2021-05-05

## 2021-05-05 LAB — INR PPP: 2.9 (ref 0.84–1.19)

## 2021-05-19 ENCOUNTER — ANTICOAG VISIT (OUTPATIENT)
Dept: VASCULAR SURGERY | Facility: CLINIC | Age: 66
End: 2021-05-19

## 2021-05-19 LAB — INR PPP: 3 (ref 0.84–1.19)

## 2021-06-02 ENCOUNTER — ANTICOAG VISIT (OUTPATIENT)
Dept: VASCULAR SURGERY | Facility: CLINIC | Age: 66
End: 2021-06-02

## 2021-06-02 LAB — INR PPP: 3 (ref 0.84–1.19)

## 2021-06-16 ENCOUNTER — ANTICOAG VISIT (OUTPATIENT)
Dept: VASCULAR SURGERY | Facility: CLINIC | Age: 66
End: 2021-06-16

## 2021-06-16 LAB — INR PPP: 2.8 (ref 0.84–1.19)

## 2021-06-30 ENCOUNTER — ANTICOAG VISIT (OUTPATIENT)
Dept: VASCULAR SURGERY | Facility: CLINIC | Age: 66
End: 2021-06-30

## 2021-06-30 LAB — INR PPP: 2.1 (ref 0.84–1.19)

## 2021-07-14 ENCOUNTER — ANTICOAG VISIT (OUTPATIENT)
Dept: VASCULAR SURGERY | Facility: CLINIC | Age: 66
End: 2021-07-14

## 2021-07-14 LAB — INR PPP: 2.9 (ref 0.84–1.19)

## 2021-07-28 ENCOUNTER — ANTICOAG VISIT (OUTPATIENT)
Dept: VASCULAR SURGERY | Facility: CLINIC | Age: 66
End: 2021-07-28

## 2021-07-28 LAB — INR PPP: 2.2 (ref 0.84–1.19)

## 2021-08-11 ENCOUNTER — ANTICOAG VISIT (OUTPATIENT)
Dept: VASCULAR SURGERY | Facility: CLINIC | Age: 66
End: 2021-08-11

## 2021-08-11 LAB — INR PPP: 2.8 (ref 0.84–1.19)

## 2021-08-25 ENCOUNTER — ANTICOAG VISIT (OUTPATIENT)
Dept: VASCULAR SURGERY | Facility: CLINIC | Age: 66
End: 2021-08-25

## 2021-08-25 LAB — INR PPP: 2.7 (ref 0.84–1.19)

## 2021-09-08 ENCOUNTER — ANTICOAG VISIT (OUTPATIENT)
Dept: VASCULAR SURGERY | Facility: CLINIC | Age: 66
End: 2021-09-08

## 2021-09-08 LAB — INR PPP: 2.7 (ref 0.84–1.19)

## 2021-09-21 DIAGNOSIS — Z92.29 HX OF LONG TERM USE OF BLOOD THINNERS: ICD-10-CM

## 2021-09-22 ENCOUNTER — ANTICOAG VISIT (OUTPATIENT)
Dept: VASCULAR SURGERY | Facility: CLINIC | Age: 66
End: 2021-09-22

## 2021-09-22 LAB — INR PPP: 2.8 (ref 0.84–1.19)

## 2021-09-23 RX ORDER — WARFARIN SODIUM 5 MG/1
TABLET ORAL
Qty: 90 TABLET | Refills: 3 | Status: SHIPPED | OUTPATIENT
Start: 2021-09-23

## 2021-10-06 ENCOUNTER — ANTICOAG VISIT (OUTPATIENT)
Dept: VASCULAR SURGERY | Facility: CLINIC | Age: 66
End: 2021-10-06

## 2021-10-06 LAB — INR PPP: 2.1 (ref 0.84–1.19)

## 2021-10-20 ENCOUNTER — ANTICOAG VISIT (OUTPATIENT)
Dept: VASCULAR SURGERY | Facility: CLINIC | Age: 66
End: 2021-10-20

## 2021-10-20 LAB — INR PPP: 2.1 (ref 0.84–1.19)

## 2021-11-03 ENCOUNTER — ANTICOAG VISIT (OUTPATIENT)
Dept: VASCULAR SURGERY | Facility: CLINIC | Age: 66
End: 2021-11-03

## 2021-11-03 LAB — INR PPP: 2.4 (ref 0.84–1.19)

## 2021-11-17 ENCOUNTER — ANTICOAG VISIT (OUTPATIENT)
Dept: VASCULAR SURGERY | Facility: CLINIC | Age: 66
End: 2021-11-17

## 2021-11-17 LAB — INR PPP: 2.2 (ref 0.84–1.19)

## 2021-12-01 ENCOUNTER — ANTICOAG VISIT (OUTPATIENT)
Dept: VASCULAR SURGERY | Facility: CLINIC | Age: 66
End: 2021-12-01

## 2021-12-01 LAB — INR PPP: 2.6 (ref 0.84–1.19)

## 2021-12-15 ENCOUNTER — ANTICOAG VISIT (OUTPATIENT)
Dept: VASCULAR SURGERY | Facility: CLINIC | Age: 66
End: 2021-12-15

## 2021-12-15 LAB — INR PPP: 2.2 (ref 0.84–1.19)

## 2021-12-29 ENCOUNTER — ANTICOAG VISIT (OUTPATIENT)
Dept: VASCULAR SURGERY | Facility: CLINIC | Age: 66
End: 2021-12-29

## 2021-12-29 LAB — INR PPP: 2.9 (ref 0.84–1.19)

## 2022-01-12 ENCOUNTER — ANTICOAG VISIT (OUTPATIENT)
Dept: VASCULAR SURGERY | Facility: CLINIC | Age: 67
End: 2022-01-12

## 2022-01-12 LAB — INR PPP: 2.5 (ref 0.84–1.19)

## 2022-01-12 NOTE — PROGRESS NOTES
Dosed per protocol  Advised that patient continue her current dose of Coumadin and recheck INR in two weeks

## 2022-01-26 ENCOUNTER — ANTICOAG VISIT (OUTPATIENT)
Dept: VASCULAR SURGERY | Facility: CLINIC | Age: 67
End: 2022-01-26

## 2022-01-26 LAB — INR PPP: 2 (ref 0.84–1.19)

## 2022-01-26 NOTE — PROGRESS NOTES
Dosed per protocol  Advised that patient continue her current dose of Coumadin and recheck INR in two weeks  Patient verbalized understanding

## 2022-02-09 ENCOUNTER — ANTICOAG VISIT (OUTPATIENT)
Dept: VASCULAR SURGERY | Facility: CLINIC | Age: 67
End: 2022-02-09

## 2022-02-09 LAB — INR PPP: 2 (ref 0.84–1.19)

## 2022-02-23 ENCOUNTER — ANTICOAG VISIT (OUTPATIENT)
Dept: VASCULAR SURGERY | Facility: CLINIC | Age: 67
End: 2022-02-23

## 2022-02-23 LAB — INR PPP: 2 (ref 0.84–1.19)

## 2022-03-09 ENCOUNTER — ANTICOAG VISIT (OUTPATIENT)
Dept: VASCULAR SURGERY | Facility: CLINIC | Age: 67
End: 2022-03-09

## 2022-03-09 LAB — INR PPP: 2 (ref 0.84–1.19)

## 2022-03-23 ENCOUNTER — ANTICOAG VISIT (OUTPATIENT)
Dept: VASCULAR SURGERY | Facility: CLINIC | Age: 67
End: 2022-03-23

## 2022-03-23 LAB — INR PPP: 2.2 (ref 0.84–1.19)

## 2022-03-23 NOTE — PROGRESS NOTES
Dosed per protocol  Advised that pt continue her current dose of Coumadin and recheck INR in two weeks

## 2022-04-06 ENCOUNTER — ANTICOAG VISIT (OUTPATIENT)
Dept: VASCULAR SURGERY | Facility: CLINIC | Age: 67
End: 2022-04-06

## 2022-04-06 LAB — INR PPP: 2.1 (ref 0.84–1.19)

## 2022-04-20 ENCOUNTER — ANTICOAG VISIT (OUTPATIENT)
Dept: VASCULAR SURGERY | Facility: CLINIC | Age: 67
End: 2022-04-20

## 2022-04-20 LAB — INR PPP: 2.3 (ref 0.84–1.19)

## 2022-05-04 ENCOUNTER — ANTICOAG VISIT (OUTPATIENT)
Dept: VASCULAR SURGERY | Facility: CLINIC | Age: 67
End: 2022-05-04

## 2022-05-04 LAB — INR PPP: 2 (ref 0.84–1.19)

## 2022-05-18 ENCOUNTER — ANTICOAG VISIT (OUTPATIENT)
Dept: VASCULAR SURGERY | Facility: CLINIC | Age: 67
End: 2022-05-18

## 2022-05-18 LAB — INR PPP: 2.9 (ref 0.84–1.19)

## 2022-06-01 ENCOUNTER — ANTICOAG VISIT (OUTPATIENT)
Dept: VASCULAR SURGERY | Facility: CLINIC | Age: 67
End: 2022-06-01

## 2022-06-01 LAB — INR PPP: 2.1 (ref 0.84–1.19)

## 2022-06-15 ENCOUNTER — ANTICOAG VISIT (OUTPATIENT)
Dept: VASCULAR SURGERY | Facility: CLINIC | Age: 67
End: 2022-06-15

## 2022-06-15 LAB — INR PPP: 2.4 (ref 0.84–1.19)

## 2022-06-29 ENCOUNTER — ANTICOAG VISIT (OUTPATIENT)
Dept: VASCULAR SURGERY | Facility: CLINIC | Age: 67
End: 2022-06-29

## 2022-06-29 LAB — INR PPP: 2.7 (ref 0.84–1.19)

## 2022-07-13 ENCOUNTER — ANTICOAG VISIT (OUTPATIENT)
Dept: VASCULAR SURGERY | Facility: CLINIC | Age: 67
End: 2022-07-13

## 2022-07-13 LAB — INR PPP: 2.2 (ref 0.84–1.19)

## 2022-07-27 ENCOUNTER — ANTICOAG VISIT (OUTPATIENT)
Dept: VASCULAR SURGERY | Facility: CLINIC | Age: 67
End: 2022-07-27

## 2022-07-27 LAB — INR PPP: 2.8 (ref 0.84–1.19)

## 2022-08-10 ENCOUNTER — ANTICOAG VISIT (OUTPATIENT)
Dept: VASCULAR SURGERY | Facility: CLINIC | Age: 67
End: 2022-08-10

## 2022-08-10 LAB — INR PPP: 2.9 (ref 0.84–1.19)

## 2022-08-24 ENCOUNTER — ANTICOAG VISIT (OUTPATIENT)
Dept: VASCULAR SURGERY | Facility: CLINIC | Age: 67
End: 2022-08-24

## 2022-08-24 LAB — INR PPP: 2.9 (ref 0.84–1.19)

## 2022-08-27 DIAGNOSIS — Z92.29 HX OF LONG TERM USE OF BLOOD THINNERS: ICD-10-CM

## 2022-08-29 RX ORDER — WARFARIN SODIUM 5 MG/1
TABLET ORAL
Qty: 90 TABLET | Refills: 3 | Status: SHIPPED | OUTPATIENT
Start: 2022-08-29

## 2022-09-07 ENCOUNTER — ANTICOAG VISIT (OUTPATIENT)
Dept: VASCULAR SURGERY | Facility: CLINIC | Age: 67
End: 2022-09-07

## 2022-09-07 LAB — INR PPP: 3 (ref 0.84–1.19)

## 2022-09-21 ENCOUNTER — ANTICOAG VISIT (OUTPATIENT)
Dept: VASCULAR SURGERY | Facility: CLINIC | Age: 67
End: 2022-09-21

## 2022-09-21 LAB — INR PPP: 2.9 (ref 0.84–1.19)

## 2022-10-05 ENCOUNTER — ANTICOAG VISIT (OUTPATIENT)
Dept: VASCULAR SURGERY | Facility: CLINIC | Age: 67
End: 2022-10-05

## 2022-10-05 LAB — INR PPP: 2.2 (ref 0.84–1.19)

## 2022-10-19 ENCOUNTER — ANTICOAG VISIT (OUTPATIENT)
Dept: VASCULAR SURGERY | Facility: CLINIC | Age: 67
End: 2022-10-19

## 2022-10-19 LAB — INR PPP: 2.3 (ref 0.84–1.19)

## 2022-11-02 ENCOUNTER — ANTICOAG VISIT (OUTPATIENT)
Dept: VASCULAR SURGERY | Facility: CLINIC | Age: 67
End: 2022-11-02

## 2022-11-02 LAB — INR PPP: 2.9 (ref 0.84–1.19)

## 2022-11-30 ENCOUNTER — ANTICOAG VISIT (OUTPATIENT)
Dept: VASCULAR SURGERY | Facility: CLINIC | Age: 67
End: 2022-11-30

## 2022-11-30 LAB — INR PPP: 2.8 (ref 0.84–1.19)

## 2022-12-14 ENCOUNTER — ANTICOAG VISIT (OUTPATIENT)
Dept: VASCULAR SURGERY | Facility: CLINIC | Age: 67
End: 2022-12-14

## 2022-12-14 LAB — INR PPP: 2.8 (ref 0.84–1.19)

## 2022-12-28 ENCOUNTER — ANTICOAG VISIT (OUTPATIENT)
Dept: VASCULAR SURGERY | Facility: CLINIC | Age: 67
End: 2022-12-28

## 2022-12-28 LAB — INR PPP: 2.9 (ref 0.84–1.19)

## 2022-12-29 NOTE — PROGRESS NOTES
Impression: Type 2 diab w mild nonprlf diabetic rtnop w/o macular edema, left eye: X40.6804. Plan: Patient was made aware of signs of active retinal disease due to diabetes. Fundus photos done today. Discussed ocular and systemic benefits of maintaining blood sugar control.  Return in one year for a complete exam. Left vm for pt, instructed to continue current dose and retest in 2 weeks

## 2023-01-11 ENCOUNTER — ANTICOAG VISIT (OUTPATIENT)
Dept: VASCULAR SURGERY | Facility: CLINIC | Age: 68
End: 2023-01-11

## 2023-01-11 LAB — INR PPP: 2.7 (ref 0.84–1.19)

## 2023-01-25 ENCOUNTER — ANTICOAG VISIT (OUTPATIENT)
Dept: VASCULAR SURGERY | Facility: CLINIC | Age: 68
End: 2023-01-25

## 2023-01-25 LAB — INR PPP: 2.4 (ref 0.84–1.19)

## 2023-02-08 ENCOUNTER — ANTICOAG VISIT (OUTPATIENT)
Dept: VASCULAR SURGERY | Facility: CLINIC | Age: 68
End: 2023-02-08

## 2023-02-08 LAB — INR PPP: 2.3 (ref 0.84–1.19)

## 2023-02-22 ENCOUNTER — ANTICOAG VISIT (OUTPATIENT)
Dept: VASCULAR SURGERY | Facility: CLINIC | Age: 68
End: 2023-02-22

## 2023-02-22 LAB — INR PPP: 2.3 (ref 0.84–1.19)

## 2023-03-09 ENCOUNTER — ANTICOAG VISIT (OUTPATIENT)
Dept: VASCULAR SURGERY | Facility: CLINIC | Age: 68
End: 2023-03-09

## 2023-03-09 LAB — INR PPP: 2.5 (ref 0.84–1.19)

## 2023-03-22 ENCOUNTER — ANTICOAG VISIT (OUTPATIENT)
Dept: VASCULAR SURGERY | Facility: CLINIC | Age: 68
End: 2023-03-22

## 2023-03-22 LAB — INR PPP: 2.5 (ref 0.84–1.19)

## 2023-04-05 ENCOUNTER — ANTICOAG VISIT (OUTPATIENT)
Dept: VASCULAR SURGERY | Facility: CLINIC | Age: 68
End: 2023-04-05

## 2023-04-05 LAB — INR PPP: 2.4 (ref 0.84–1.19)

## 2023-05-03 ENCOUNTER — ANTICOAG VISIT (OUTPATIENT)
Dept: VASCULAR SURGERY | Facility: CLINIC | Age: 68
End: 2023-05-03

## 2023-05-03 LAB — INR PPP: 2.3 (ref 0.84–1.19)

## 2023-05-18 ENCOUNTER — ANTICOAG VISIT (OUTPATIENT)
Dept: VASCULAR SURGERY | Facility: CLINIC | Age: 68
End: 2023-05-18

## 2023-05-18 LAB — INR PPP: 2.3 (ref 0.84–1.19)

## 2023-05-31 ENCOUNTER — ANTICOAG VISIT (OUTPATIENT)
Dept: VASCULAR SURGERY | Facility: CLINIC | Age: 68
End: 2023-05-31

## 2023-05-31 LAB — INR PPP: 2.7 (ref 0.84–1.19)

## 2023-06-14 ENCOUNTER — ANTICOAG VISIT (OUTPATIENT)
Dept: VASCULAR SURGERY | Facility: CLINIC | Age: 68
End: 2023-06-14

## 2023-06-14 LAB — INR PPP: 2.9 (ref 0.84–1.19)

## 2023-06-29 ENCOUNTER — ANTICOAG VISIT (OUTPATIENT)
Dept: VASCULAR SURGERY | Facility: CLINIC | Age: 68
End: 2023-06-29

## 2023-06-29 LAB — INR PPP: 2.6 (ref 0.84–1.19)

## 2023-07-12 ENCOUNTER — ANTICOAG VISIT (OUTPATIENT)
Dept: VASCULAR SURGERY | Facility: CLINIC | Age: 68
End: 2023-07-12

## 2023-07-12 LAB — INR PPP: 2.9 (ref 0.84–1.19)

## 2023-07-28 ENCOUNTER — ANTICOAG VISIT (OUTPATIENT)
Dept: VASCULAR SURGERY | Facility: CLINIC | Age: 68
End: 2023-07-28

## 2023-07-28 LAB — INR PPP: 2.9 (ref 0.84–1.19)

## 2023-08-09 ENCOUNTER — ANTICOAG VISIT (OUTPATIENT)
Dept: VASCULAR SURGERY | Facility: CLINIC | Age: 68
End: 2023-08-09

## 2023-08-09 LAB — INR PPP: 2.8 (ref 0.84–1.19)

## 2023-08-17 DIAGNOSIS — Z92.29 HX OF LONG TERM USE OF BLOOD THINNERS: ICD-10-CM

## 2023-08-18 RX ORDER — WARFARIN SODIUM 5 MG/1
TABLET ORAL
Qty: 90 TABLET | Refills: 3 | Status: SHIPPED | OUTPATIENT
Start: 2023-08-18

## 2023-08-23 ENCOUNTER — ANTICOAG VISIT (OUTPATIENT)
Dept: VASCULAR SURGERY | Facility: CLINIC | Age: 68
End: 2023-08-23

## 2023-08-23 LAB — INR PPP: 2.5 (ref 0.84–1.19)

## 2023-09-05 NOTE — PROGRESS NOTES
Advised that pt continue her current dose of Coumadin and recheck INR in two weeks. Lot #: [id number] Lot #: [id number]

## 2023-09-06 ENCOUNTER — ANTICOAG VISIT (OUTPATIENT)
Dept: VASCULAR SURGERY | Facility: CLINIC | Age: 68
End: 2023-09-06

## 2023-09-06 LAB — INR PPP: 2.6 (ref 0.84–1.19)

## 2023-09-20 ENCOUNTER — ANTICOAG VISIT (OUTPATIENT)
Dept: VASCULAR SURGERY | Facility: CLINIC | Age: 68
End: 2023-09-20

## 2023-09-20 LAB — INR PPP: 2.9 (ref 0.84–1.19)

## 2023-10-04 ENCOUNTER — ANTICOAG VISIT (OUTPATIENT)
Dept: VASCULAR SURGERY | Facility: CLINIC | Age: 68
End: 2023-10-04

## 2023-10-04 LAB — INR PPP: 2.8 (ref 0.84–1.19)

## 2023-10-19 ENCOUNTER — ANTICOAG VISIT (OUTPATIENT)
Dept: VASCULAR SURGERY | Facility: CLINIC | Age: 68
End: 2023-10-19

## 2023-10-19 LAB — INR PPP: 2.9 (ref 0.84–1.19)

## 2023-11-01 ENCOUNTER — ANTICOAG VISIT (OUTPATIENT)
Dept: VASCULAR SURGERY | Facility: CLINIC | Age: 68
End: 2023-11-01

## 2023-11-01 LAB — INR PPP: 2.6 (ref 0.84–1.19)

## 2023-11-15 ENCOUNTER — ANTICOAG VISIT (OUTPATIENT)
Dept: VASCULAR SURGERY | Facility: CLINIC | Age: 68
End: 2023-11-15

## 2023-11-15 LAB — INR PPP: 2.9 (ref 0.84–1.19)

## 2023-11-29 ENCOUNTER — ANTICOAG VISIT (OUTPATIENT)
Dept: VASCULAR SURGERY | Facility: CLINIC | Age: 68
End: 2023-11-29

## 2023-11-29 LAB — INR PPP: 2.9 (ref 0.84–1.19)

## 2023-12-15 ENCOUNTER — ANTICOAG VISIT (OUTPATIENT)
Dept: VASCULAR SURGERY | Facility: CLINIC | Age: 68
End: 2023-12-15

## 2023-12-15 LAB — INR PPP: 2.9 (ref 0.84–1.19)

## 2023-12-27 ENCOUNTER — ANTICOAG VISIT (OUTPATIENT)
Dept: VASCULAR SURGERY | Facility: CLINIC | Age: 68
End: 2023-12-27

## 2023-12-27 LAB — INR PPP: 2.4 (ref 0.84–1.19)

## 2024-01-10 ENCOUNTER — ANTICOAG VISIT (OUTPATIENT)
Dept: VASCULAR SURGERY | Facility: CLINIC | Age: 69
End: 2024-01-10

## 2024-01-10 LAB — INR PPP: 2.7 (ref 0.84–1.19)

## 2024-01-24 ENCOUNTER — ANTICOAG VISIT (OUTPATIENT)
Dept: VASCULAR SURGERY | Facility: CLINIC | Age: 69
End: 2024-01-24

## 2024-01-24 LAB — INR PPP: 2.1 (ref 0.84–1.19)

## 2024-02-08 ENCOUNTER — ANTICOAG VISIT (OUTPATIENT)
Dept: VASCULAR SURGERY | Facility: CLINIC | Age: 69
End: 2024-02-08

## 2024-02-08 LAB — INR PPP: 2.5 (ref 0.84–1.19)

## 2024-02-21 ENCOUNTER — ANTICOAG VISIT (OUTPATIENT)
Dept: VASCULAR SURGERY | Facility: CLINIC | Age: 69
End: 2024-02-21

## 2024-02-21 LAB — INR PPP: 2.8 (ref 0.84–1.19)

## 2024-03-06 ENCOUNTER — ANTICOAG VISIT (OUTPATIENT)
Dept: VASCULAR SURGERY | Facility: CLINIC | Age: 69
End: 2024-03-06

## 2024-03-06 LAB — INR PPP: 2.3 (ref 0.84–1.19)

## 2024-03-13 ENCOUNTER — OFFICE VISIT (OUTPATIENT)
Dept: VASCULAR SURGERY | Facility: CLINIC | Age: 69
End: 2024-03-13
Payer: COMMERCIAL

## 2024-03-13 VITALS
DIASTOLIC BLOOD PRESSURE: 72 MMHG | HEIGHT: 64 IN | SYSTOLIC BLOOD PRESSURE: 126 MMHG | HEART RATE: 68 BPM | BODY MASS INDEX: 33.13 KG/M2

## 2024-03-13 DIAGNOSIS — Z92.29 HX OF LONG TERM USE OF BLOOD THINNERS: ICD-10-CM

## 2024-03-13 DIAGNOSIS — Z79.01 LONG TERM CURRENT USE OF ANTICOAGULANT: Primary | ICD-10-CM

## 2024-03-13 PROCEDURE — 99203 OFFICE O/P NEW LOW 30 MIN: CPT | Performed by: NURSE PRACTITIONER

## 2024-03-13 RX ORDER — GABAPENTIN 300 MG/1
300 CAPSULE ORAL 3 TIMES DAILY
COMMUNITY

## 2024-03-13 NOTE — ASSESSMENT & PLAN NOTE
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  Patient last seen in vascular office >5 years ago.    -Presents without complaints.  Tolerating anticoagulation without issue.  Denies bleeding issues.  -Updated medical history reviewed and last wellness visit reviewed ([facility])    Plan:  -Continue Coumadin, INR based dosing.  Lifelong anticoagulation  -Return to vascular office as needed

## 2024-03-13 NOTE — PATIENT INSTRUCTIONS
Continue Coumadin INR based dosing  Lifelong anticoagulation.  Call or return to office with new or worsening symptoms questions or concerns  Follow up as needed.

## 2024-03-13 NOTE — PROGRESS NOTES
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  Patient last seen in vascular office >5 years ago.    -Presents without complaints.  Tolerating anticoagulation without issue.  Denies bleeding issues.  -Updated medical history reviewed and last wellness visit reviewed ([facility])    Plan:  -Continue Coumadin, INR based dosing.  Lifelong anticoagulation  -Return to vascular office as needed       Diagnoses and all orders for this visit:    Long term current use of anticoagulant    Hx of long term use of blood thinners  -     Ambulatory Referral to Vascular Surgery    Other orders  -     gabapentin (NEURONTIN) 300 mg capsule; Take 300 mg by mouth 3 (three) times a day          Subjective:      Patient ID: Kaela Melendez is a 69 y.o. female.    New patient, presents to discuss warfarin. Patient has h/o b/l DVT.     HPI  See assessment and plan      Long term anticoagulation patient on Coumadin managed by vascular center.  History of DVT/PE. Patient has not been seen in vascular office for > 5 years.  Compliant with Coumadin.  INR based dosing without issue  Patient denies bleeding complication  Follows with [facility] primary.  Last office visit reviewed.    Discussed risks and benefits of continued anticoagulation.  Will continue Coumadin with INR based dosing  Advised to call or return to office with new or worsening symptoms, questions or concerns.    The following portions of the patient's history were reviewed and updated as appropriate: allergies, current medications, past family history, past medical history, past social history, past surgical history, and problem list.    Review of Systems   Constitutional: Negative.    HENT: Negative.     Eyes: Negative.    Respiratory: Negative.     Cardiovascular:  "Negative.    Gastrointestinal: Negative.    Endocrine: Negative.    Genitourinary: Negative.    Musculoskeletal: Negative.    Skin: Negative.    Allergic/Immunologic: Negative.    Neurological: Negative.    Hematological: Negative.    Psychiatric/Behavioral: Negative.         I have reviewed and made appropriate changes to the review of systems input by the medical assistant.    Objective:      /72 (BP Location: Right arm, Patient Position: Sitting, Cuff Size: Standard)   Pulse 68   Ht 5' 4\" (1.626 m)   BMI 33.13 kg/m²          Physical Exam  Vitals reviewed.   Constitutional:       General: She is not in acute distress.  HENT:      Head: Normocephalic and atraumatic.   Pulmonary:      Effort: Pulmonary effort is normal. No respiratory distress.   Neurological:      Mental Status: She is alert and oriented to person, place, and time.   Psychiatric:         Behavior: Behavior normal.         I have spent a total time of 20 minutes on 03/13/24 in caring for this patient including Risks and benefits of tx options, Instructions for management, Patient and family education, Importance of tx compliance, Documenting in the medical record, and Obtaining or reviewing history  .      Vitals:    03/13/24 1000   BP: 126/72   BP Location: Right arm   Patient Position: Sitting   Cuff Size: Standard   Pulse: 68   Height: 5' 4\" (1.626 m)       Patient Active Problem List   Diagnosis    Long term current use of anticoagulant       Past Surgical History:   Procedure Laterality Date    COLONOSCOPY      OVARIAN CYST REMOVAL      DE TOTAL HIP ARTHROPLASTY Right 3/11/2016    Procedure: ARTHROPLASTY HIP TOTAL;  Surgeon: Salbador Clements MD;  Location: [facility] OR;  Service: Orthopedics    TONSILLECTOMY         No family history on file.    Social History     Socioeconomic History    Marital status: Single     Spouse name: Not on file    Number of children: Not on file    Years of education: Not on file    Highest education level: Not " on file   Occupational History    Not on file   Tobacco Use    Smoking status: Never    Smokeless tobacco: Not on file   Substance and Sexual Activity    Alcohol use: Yes     Comment: 1-2  / day    Drug use: No    Sexual activity: Never   Other Topics Concern    Not on file   Social History Narrative    Not on file     Social Determinants of Health     Financial Resource Strain: Low Risk  (8/16/2023)    Received from [facility]    Overall Financial Resource Strain (CARDIA)     Difficulty of Paying Living Expenses: Not very hard   Food Insecurity: No Food Insecurity (8/16/2023)    Received from [facility]    Hunger Vital Sign     Worried About Running Out of Food in the Last Year: Never true     Ran Out of Food in the Last Year: Never true   Transportation Needs: No Transportation Needs (8/16/2023)    Received from [facility]    PRAPARE - Transportation     Lack of Transportation (Medical): No     Lack of Transportation (Non-Medical): No   Physical Activity: Not on file   Stress: No Stress Concern Present (8/16/2023)    Received from [facility]    [place] citizen [place] [employer] - Occupational Stress Questionnaire     Feeling of Stress : Only a little   Social Connections: Socially Isolated (8/16/2023)    Received from [facility]    Social Connection and Isolation Panel [NHANES]     Frequency of Communication with Friends and Family: Three times a week     Frequency of Social Gatherings with Friends and Family: Three times a week     Attends Congregation Services: Never     Active Member of Clubs or Organizations: No     Attends Club or Organization Meetings: Never     Marital Status: Never    Intimate Partner Violence: Not At Risk (8/16/2023)    Received from [facility]    Humiliation, Afraid, Rape, and Kick questionnaire     Fear of Current or Ex-Partner: No     Emotionally Abused: No      Physically Abused: No     Sexually Abused: No   Housing Stability: Low Risk  (8/16/2023)    Received from [facility]    Housing Stability Vital Sign     Unable to Pay for Housing in the Last Year: No     Number of Places Lived in the Last Year: 1     Unstable Housing in the Last Year: No       Allergies   Allergen Reactions    Hydrochlorothiazide Other (See Comments)     HYPONATREMIA    Ace Inhibitors Cough         Current Outpatient Medications:     Calcium Carb-Cholecalciferol (CALCIUM 600 + D PO), Take 1 tablet by mouth daily., Disp: , Rfl:     clonazePAM (KlonoPIN) 0.25 MG disintegrating tablet, Take 0.25 mg by mouth 2 (two) times a day., Disp: , Rfl:     gabapentin (NEURONTIN) 300 mg capsule, Take 300 mg by mouth 3 (three) times a day, Disp: , Rfl:     losartan-hydrochlorothiazide (HYZAAR) 100-25 MG per tablet, Take 1 tablet by mouth daily., Disp: , Rfl:     warfarin (COUMADIN) 5 mg tablet, TAKE 1 TABLET BY MOUTH EVERY DAY OR AS DIRECTED, Disp: 90 tablet, Rfl: 3

## 2024-03-21 ENCOUNTER — ANTICOAG VISIT (OUTPATIENT)
Dept: VASCULAR SURGERY | Facility: CLINIC | Age: 69
End: 2024-03-21

## 2024-03-21 LAB — INR PPP: 2.1 (ref 0.84–1.19)

## 2024-04-03 ENCOUNTER — ANTICOAG VISIT (OUTPATIENT)
Dept: VASCULAR SURGERY | Facility: CLINIC | Age: 69
End: 2024-04-03

## 2024-04-03 LAB — INR PPP: 2.2 (ref 0.84–1.19)

## 2024-04-18 ENCOUNTER — ANTICOAG VISIT (OUTPATIENT)
Dept: CARDIOLOGY CLINIC | Facility: CLINIC | Age: 69
End: 2024-04-18

## 2024-04-18 LAB — INR PPP: 2.4 (ref 0.84–1.19)

## 2024-05-07 ENCOUNTER — ANTICOAG VISIT (OUTPATIENT)
Dept: CARDIOLOGY CLINIC | Facility: CLINIC | Age: 69
End: 2024-05-07

## 2024-05-07 LAB — INR PPP: 2.1 (ref 0.84–1.19)

## 2024-05-22 ENCOUNTER — ANTICOAG VISIT (OUTPATIENT)
Dept: CARDIOLOGY CLINIC | Facility: CLINIC | Age: 69
End: 2024-05-22

## 2024-05-22 LAB — INR PPP: 2.4 (ref 0.84–1.19)

## 2024-06-06 ENCOUNTER — ANTICOAG VISIT (OUTPATIENT)
Dept: CARDIOLOGY CLINIC | Facility: CLINIC | Age: 69
End: 2024-06-06

## 2024-06-06 LAB — INR PPP: 2.3 (ref 0.84–1.19)

## 2024-06-19 LAB — INR PPP: 2.2 (ref 0.84–1.19)

## 2024-06-20 ENCOUNTER — ANTICOAG VISIT (OUTPATIENT)
Dept: CARDIOLOGY CLINIC | Facility: CLINIC | Age: 69
End: 2024-06-20

## 2024-07-03 ENCOUNTER — ANTICOAG VISIT (OUTPATIENT)
Dept: CARDIOLOGY CLINIC | Facility: CLINIC | Age: 69
End: 2024-07-03

## 2024-07-03 LAB — INR PPP: 2.9 (ref 0.84–1.19)

## 2024-07-17 LAB — INR PPP: 3.2 (ref 0.84–1.19)

## 2024-07-18 ENCOUNTER — ANTICOAG VISIT (OUTPATIENT)
Dept: CARDIOLOGY CLINIC | Facility: CLINIC | Age: 69
End: 2024-07-18

## 2024-07-24 ENCOUNTER — ANTICOAG VISIT (OUTPATIENT)
Dept: CARDIOLOGY CLINIC | Facility: CLINIC | Age: 69
End: 2024-07-24

## 2024-07-24 LAB — INR PPP: 2.6 (ref 0.84–1.19)

## 2024-08-01 DIAGNOSIS — Z92.29 HX OF LONG TERM USE OF BLOOD THINNERS: ICD-10-CM

## 2024-08-01 RX ORDER — WARFARIN SODIUM 5 MG/1
TABLET ORAL
Qty: 90 TABLET | Refills: 3 | Status: SHIPPED | OUTPATIENT
Start: 2024-08-01

## 2024-08-08 LAB — INR PPP: 2.1 (ref 0.85–1.19)

## 2024-08-09 ENCOUNTER — ANTICOAG VISIT (OUTPATIENT)
Dept: CARDIOLOGY CLINIC | Facility: CLINIC | Age: 69
End: 2024-08-09

## 2024-08-26 LAB — INR PPP: 2.8 (ref 0.85–1.19)

## 2024-08-29 ENCOUNTER — ANTICOAG VISIT (OUTPATIENT)
Dept: CARDIOLOGY CLINIC | Facility: CLINIC | Age: 69
End: 2024-08-29

## 2024-09-11 ENCOUNTER — ANTICOAG VISIT (OUTPATIENT)
Dept: CARDIOLOGY CLINIC | Facility: CLINIC | Age: 69
End: 2024-09-11

## 2024-09-11 LAB — INR PPP: 2.1 (ref 0.85–1.19)

## 2024-09-18 DIAGNOSIS — Z92.29 HX OF LONG TERM USE OF BLOOD THINNERS: ICD-10-CM

## 2024-09-18 RX ORDER — WARFARIN SODIUM 5 MG/1
5 TABLET ORAL DAILY
Qty: 90 TABLET | Refills: 3 | Status: SHIPPED | OUTPATIENT
Start: 2024-09-18

## 2024-09-25 ENCOUNTER — ANTICOAG VISIT (OUTPATIENT)
Dept: CARDIOLOGY CLINIC | Facility: CLINIC | Age: 69
End: 2024-09-25

## 2024-09-25 LAB — INR PPP: 2.5 (ref 0.85–1.19)

## 2024-10-10 ENCOUNTER — ANTICOAG VISIT (OUTPATIENT)
Dept: CARDIOLOGY CLINIC | Facility: CLINIC | Age: 69
End: 2024-10-10

## 2024-10-10 LAB — INR PPP: 3 (ref 0.85–1.19)

## 2024-10-23 ENCOUNTER — ANTICOAG VISIT (OUTPATIENT)
Dept: CARDIOLOGY CLINIC | Facility: CLINIC | Age: 69
End: 2024-10-23

## 2024-10-23 LAB — INR PPP: 2.9 (ref 0.85–1.19)

## 2024-11-06 ENCOUNTER — ANTICOAG VISIT (OUTPATIENT)
Dept: CARDIOLOGY CLINIC | Facility: CLINIC | Age: 69
End: 2024-11-06

## 2024-11-06 LAB — INR PPP: 2.8 (ref 0.85–1.19)

## 2024-11-21 LAB — INR PPP: 2.4 (ref 0.85–1.19)

## 2024-11-22 ENCOUNTER — ANTICOAG VISIT (OUTPATIENT)
Dept: CARDIOLOGY CLINIC | Facility: CLINIC | Age: 69
End: 2024-11-22

## 2024-12-05 ENCOUNTER — ANTICOAG VISIT (OUTPATIENT)
Dept: CARDIOLOGY CLINIC | Facility: CLINIC | Age: 69
End: 2024-12-05

## 2024-12-05 LAB — INR PPP: 3 (ref 0.85–1.19)

## 2024-12-18 ENCOUNTER — ANTICOAG VISIT (OUTPATIENT)
Dept: CARDIOLOGY CLINIC | Facility: CLINIC | Age: 69
End: 2024-12-18

## 2024-12-18 LAB — INR PPP: 2.7 (ref 0.85–1.19)

## 2025-01-02 LAB — INR PPP: 2.7 (ref 0.85–1.19)

## 2025-01-06 ENCOUNTER — ANTICOAG VISIT (OUTPATIENT)
Dept: CARDIOLOGY CLINIC | Facility: CLINIC | Age: 70
End: 2025-01-06

## 2025-01-06 DIAGNOSIS — Z79.01 LONG TERM CURRENT USE OF ANTICOAGULANT: Primary | ICD-10-CM

## 2025-01-16 LAB — INR PPP: 2.4 (ref 0.85–1.19)

## 2025-01-17 ENCOUNTER — ANTICOAG VISIT (OUTPATIENT)
Dept: CARDIOLOGY CLINIC | Facility: CLINIC | Age: 70
End: 2025-01-17

## 2025-01-17 DIAGNOSIS — Z79.01 LONG TERM CURRENT USE OF ANTICOAGULANT: Primary | ICD-10-CM

## 2025-01-29 LAB — INR PPP: 2.1 (ref 0.85–1.19)

## 2025-01-30 ENCOUNTER — ANTICOAG VISIT (OUTPATIENT)
Dept: CARDIOLOGY CLINIC | Facility: CLINIC | Age: 70
End: 2025-01-30

## 2025-01-30 DIAGNOSIS — Z79.01 LONG TERM CURRENT USE OF ANTICOAGULANT: Primary | ICD-10-CM

## 2025-02-12 ENCOUNTER — ANTICOAG VISIT (OUTPATIENT)
Dept: CARDIOLOGY CLINIC | Facility: CLINIC | Age: 70
End: 2025-02-12

## 2025-02-12 DIAGNOSIS — Z79.01 LONG TERM CURRENT USE OF ANTICOAGULANT: Primary | ICD-10-CM

## 2025-02-12 LAB — INR PPP: 2.2 (ref 0.85–1.19)

## 2025-02-26 ENCOUNTER — ANTICOAG VISIT (OUTPATIENT)
Dept: CARDIOLOGY CLINIC | Facility: CLINIC | Age: 70
End: 2025-02-26

## 2025-02-26 DIAGNOSIS — Z79.01 LONG TERM CURRENT USE OF ANTICOAGULANT: Primary | ICD-10-CM

## 2025-02-26 LAB — INR PPP: 2.6 (ref 0.85–1.19)

## 2025-03-11 LAB — INR PPP: 2.7 (ref 0.85–1.19)

## 2025-03-12 ENCOUNTER — ANTICOAG VISIT (OUTPATIENT)
Dept: CARDIOLOGY CLINIC | Facility: CLINIC | Age: 70
End: 2025-03-12

## 2025-03-12 DIAGNOSIS — Z79.01 LONG TERM CURRENT USE OF ANTICOAGULANT: Primary | ICD-10-CM

## 2025-03-26 LAB — INR PPP: 2.8 (ref 0.85–1.19)

## 2025-03-27 ENCOUNTER — ANTICOAG VISIT (OUTPATIENT)
Dept: CARDIOLOGY CLINIC | Facility: CLINIC | Age: 70
End: 2025-03-27

## 2025-03-27 DIAGNOSIS — Z79.01 LONG TERM CURRENT USE OF ANTICOAGULANT: Primary | ICD-10-CM

## 2025-04-24 LAB — INR PPP: 2.4 (ref 0.85–1.19)

## 2025-04-25 ENCOUNTER — ANTICOAG VISIT (OUTPATIENT)
Dept: CARDIOLOGY CLINIC | Facility: CLINIC | Age: 70
End: 2025-04-25

## 2025-04-25 DIAGNOSIS — Z79.01 LONG TERM CURRENT USE OF ANTICOAGULANT: Primary | ICD-10-CM

## 2025-05-08 LAB — INR PPP: 2.4 (ref 0.85–1.19)

## 2025-05-09 ENCOUNTER — ANTICOAG VISIT (OUTPATIENT)
Dept: CARDIOLOGY CLINIC | Facility: CLINIC | Age: 70
End: 2025-05-09

## 2025-05-09 DIAGNOSIS — Z79.01 LONG TERM CURRENT USE OF ANTICOAGULANT: Primary | ICD-10-CM

## 2025-05-22 ENCOUNTER — ANTICOAG VISIT (OUTPATIENT)
Dept: CARDIOLOGY CLINIC | Facility: CLINIC | Age: 70
End: 2025-05-22

## 2025-05-22 DIAGNOSIS — Z79.01 LONG TERM CURRENT USE OF ANTICOAGULANT: Primary | ICD-10-CM

## 2025-05-22 LAB — INR PPP: 2.2 (ref 0.85–1.19)

## 2025-06-05 ENCOUNTER — ANTICOAG VISIT (OUTPATIENT)
Dept: CARDIOLOGY CLINIC | Facility: CLINIC | Age: 70
End: 2025-06-05

## 2025-06-05 DIAGNOSIS — Z79.01 LONG TERM CURRENT USE OF ANTICOAGULANT: Primary | ICD-10-CM

## 2025-06-05 LAB — INR PPP: 2 (ref 0.85–1.19)

## 2025-06-19 ENCOUNTER — ANTICOAG VISIT (OUTPATIENT)
Dept: CARDIOLOGY CLINIC | Facility: CLINIC | Age: 70
End: 2025-06-19

## 2025-06-19 DIAGNOSIS — Z79.01 LONG TERM CURRENT USE OF ANTICOAGULANT: Primary | ICD-10-CM

## 2025-06-19 LAB — INR PPP: 2.7 (ref 0.85–1.19)

## 2025-07-04 LAB — INR PPP: 2.9 (ref 0.85–1.19)

## 2025-07-08 ENCOUNTER — ANTICOAG VISIT (OUTPATIENT)
Dept: CARDIOLOGY CLINIC | Facility: CLINIC | Age: 70
End: 2025-07-08

## 2025-07-08 DIAGNOSIS — Z79.01 LONG TERM CURRENT USE OF ANTICOAGULANT: Primary | ICD-10-CM

## 2025-07-17 ENCOUNTER — ANTICOAG VISIT (OUTPATIENT)
Dept: CARDIOLOGY CLINIC | Facility: CLINIC | Age: 70
End: 2025-07-17

## 2025-07-17 DIAGNOSIS — Z79.01 LONG TERM CURRENT USE OF ANTICOAGULANT: Primary | ICD-10-CM

## 2025-07-17 LAB — INR PPP: 2.1 (ref 0.85–1.19)

## 2025-07-31 LAB — INR PPP: 2.4 (ref 0.85–1.19)

## 2025-08-01 ENCOUNTER — ANTICOAG VISIT (OUTPATIENT)
Dept: CARDIOLOGY CLINIC | Facility: CLINIC | Age: 70
End: 2025-08-01

## 2025-08-01 DIAGNOSIS — Z79.01 LONG TERM CURRENT USE OF ANTICOAGULANT: Primary | ICD-10-CM

## 2025-08-14 ENCOUNTER — ANTICOAG VISIT (OUTPATIENT)
Dept: CARDIOLOGY CLINIC | Facility: CLINIC | Age: 70
End: 2025-08-14